# Patient Record
Sex: FEMALE | Race: BLACK OR AFRICAN AMERICAN | Employment: OTHER | RURAL
[De-identification: names, ages, dates, MRNs, and addresses within clinical notes are randomized per-mention and may not be internally consistent; named-entity substitution may affect disease eponyms.]

---

## 2017-02-03 ENCOUNTER — OFFICE VISIT (OUTPATIENT)
Dept: FAMILY MEDICINE CLINIC | Age: 72
End: 2017-02-03

## 2017-02-03 VITALS
OXYGEN SATURATION: 99 % | TEMPERATURE: 98.1 F | SYSTOLIC BLOOD PRESSURE: 152 MMHG | HEIGHT: 70 IN | DIASTOLIC BLOOD PRESSURE: 72 MMHG | RESPIRATION RATE: 16 BRPM | BODY MASS INDEX: 28.29 KG/M2 | HEART RATE: 97 BPM | WEIGHT: 197.6 LBS

## 2017-02-03 DIAGNOSIS — Z13.39 SCREENING FOR ALCOHOLISM: ICD-10-CM

## 2017-02-03 DIAGNOSIS — F17.200 SMOKER: ICD-10-CM

## 2017-02-03 DIAGNOSIS — I10 ESSENTIAL HYPERTENSION: ICD-10-CM

## 2017-02-03 DIAGNOSIS — J42 CHRONIC BRONCHITIS, UNSPECIFIED CHRONIC BRONCHITIS TYPE (HCC): ICD-10-CM

## 2017-02-03 DIAGNOSIS — E11.9 TYPE 2 DIABETES MELLITUS WITHOUT COMPLICATION, WITHOUT LONG-TERM CURRENT USE OF INSULIN (HCC): ICD-10-CM

## 2017-02-03 DIAGNOSIS — E78.2 MIXED HYPERLIPIDEMIA: ICD-10-CM

## 2017-02-03 DIAGNOSIS — Z00.00 ROUTINE GENERAL MEDICAL EXAMINATION AT A HEALTH CARE FACILITY: Primary | ICD-10-CM

## 2017-02-03 NOTE — PATIENT INSTRUCTIONS
Medicare Wellness Visit, Female    The best way to live healthy is to have a healthy lifestyle by eating a well-balanced diet, exercising regularly, limiting alcohol and stopping smoking. Regular physical exams and screening tests are another way to keep healthy. Preventive exams provided by your health care provider can find health problems before they become diseases or illnesses. Preventive services including immunizations, screening tests, monitoring and exams can help you take care of your own health. All people over age 72 should have a pneumovax  and and a prevnar shot to prevent pneumonia. These are once in a lifetime unless you and your provider decide differently. All people over 65 should have a yearly flu shot and a tetanus vaccine every 10 years. A bone mass density to screen for osteoporosis or thinning of the bones should be done every 2 years after 65. Screening for diabetes mellitus with a blood sugar test should be done every year. Glaucoma is a disease of the eye due to increased ocular pressure that can lead to blindness and it should be done every year by an eye professional.    Cardiovascular screening tests that check for elevated lipids (fatty part of blood) which can lead to heart disease and strokes should be done every 5 years. Colorectal screening that evaluates for blood or polyps in your colon should be done yearly as a stool test or every five years as a flexible sigmoidoscope or every 10 years as a colonoscopy up to age 76. Breast cancer screening with a mammogram is recommended biennially  for women age 54-69. Screening for cervical cancer with a pap smear and pelvic exam is recommended for women after age 72 years every 2 years up to age 79 or when the provider and patient decide to stop. If there is a history of cervical abnormalities or other increased risk for cancer then the test is recommended yearly.     Hepatitis C screening is also recommended for anyone born between [address] [place] [address] through [place] [address]. A shingles vaccine is also recommended once in a lifetime after age 61. Your Medicare Wellness Exam is recommended annually.     Here is a list of your current Health Maintenance items with a due date:  Health Maintenance Due   Topic Date Due   Rebecca Maddox Test  1945    Eye Exam  03/17/1957    DTaP/Tdap/Td  (1 - Tdap) 03/17/1968    Stool testing for trace blood  03/17/1997    Shingles Vaccine  03/17/2007    Glaucoma Screening   03/17/2012    Bone Density Screening  03/17/2012    Pneumococcal Vaccine (1 of 2 - PCV13) 03/17/2012    Flu Vaccine  08/01/2016    Hemoglobin A1C    10/07/2016    Annual Well Visit  10/09/2016    Diabetic Foot Care  10/09/2016    Albumin Urine Test  10/09/2016

## 2017-02-03 NOTE — PROGRESS NOTES
[address] [place] [address] Medical [place]   [phone]     2/3/2017  Chief Complaint   Patient presents with   Ioana Marvin Visit       HPI  Ms. Wilberto Archer is a 73 y.o. female presents today for routine annual visit. Tingling in her hands at nights, especially the right hand      Heartburn- occasionally; uses OTC joo seltza at times with good resolution. Patient Active Problem List   Diagnosis Code    Diabetes ([facility] E11.9    Hypertension I10    Hyperlipidemia E78.5    Arthritis, degenerative M19.90    Chronic bronchitis (HCC) J42    Smoker F17.200      No Known Allergies    Current Outpatient Prescriptions on File Prior to Visit   Medication Sig Dispense Refill    metoprolol succinate (TOPROL-XL) 50 mg XL tablet TAKE ONE TABLET BY MOUTH ONCE DAILY 30 Tab 2    metFORMIN ER (GLUCOPHAGE XR) 500 mg tablet TAKE ONE TABLET BY MOUTH ONCE DAILY WITH  DINNER 30 Tab 5    VENTOLIN HFA 90 mcg/actuation inhaler INHALE TWO PUFFS BY MOUTH EVERY 6 HOURS AS NEEDED FOR WHEEZING FOR  ACUTE  ASTHMA  ATTACK 1 Inhaler 3    losartan (COZAAR) 100 mg tablet Take 1 Tab by mouth daily. 30 Tab 6    budesonide-formoterol (SYMBICORT) 160-4.5 mcg/actuation HFA inhaler Take 2 Puffs by inhalation two (2) times a day. Indications: BRONCHOSPASM PREVENTION WITH COPD 1 Inhaler 6    chlorthalidone (HYGROTEN) 25 mg tablet Take 1 Tab by mouth daily. Indications: HYPERTENSION 30 Tab 6    simvastatin (ZOCOR) 20 mg tablet Take 1 Tab by mouth nightly. 30 Tab 3    LORazepam (ATIVAN) 0.5 mg tablet TAKE ONE TABLET BY MOUTH EVERY 8 HOURS AS NEEDED FOR ANXIETY 30 Tab 0     No current facility-administered medications on file prior to visit.         Lab Results   Component Value Date/Time    Hemoglobin A1c 6.7 04/07/2016 11:33 AM    Hemoglobin A1c 6.4 10/09/2015 09:30 AM    Hemoglobin A1c 7.0 10/03/2014 11:33 AM    Glucose 121 04/07/2016 11:33 AM    Glucose  10/03/2014 11:50 AM    LDL, calculated 117 04/07/2016 11:33 AM    Creatinine 0.75 04/07/2016 11:33 AM           Physical Exam  Visit Vitals    /72 (BP 1 Location: Left arm, BP Patient Position: Sitting)    Pulse 97    Temp 98.1 °F (36.7 °C) (Oral)    Resp 16    Ht 5' 10\" (1.778 m)    Wt 197 lb 9.6 oz (89.6 kg)    SpO2 99%    BMI 28.35 kg/m2         Ash Mcdonald was seen today for annual wellness visit.     Diagnoses and all orders for this visit:    Routine general medical examination at a health care facility  -     CBC WITH AUTOMATED DIFF  -     OCCULT BLOOD, IMMUNOASSAY (FIT)    Screening for alcoholism    Type 2 diabetes mellitus without complication, without long-term current use of insulin (HCC)  -     METABOLIC PANEL, COMPREHENSIVE  -     HEMOGLOBIN A1C WITH EAG  -     MICROALBUMIN, UR, RAND W/ MICROALBUMIN/CREA RATIO    Chronic bronchitis, unspecified chronic bronchitis type (HCC)    Essential hypertension    Mixed hyperlipidemia  -     LIPID PANEL WITH LDL/HDL RATIO    Smoker      Plan         Follow-up Disposition: Not on File      [facility]

## 2017-02-03 NOTE — MR AVS SNAPSHOT
Visit Information Date & Time Provider Department Dept. Phone Encounter #  
 2/3/2017 11:00 AM Zamzam Campbell, [address] [phone] [id number] Upcoming Health Maintenance Date Due Hepatitis C Screening 1945 DTaP/Tdap/Td series (1 - Tdap) 3/17/1968 FOBT Q 1 YEAR AGE 50-75 3/17/1997 ZOSTER VACCINE AGE 60> 3/17/2007 GLAUCOMA SCREENING Q2Y 3/17/2012 OSTEOPOROSIS SCREENING (DEXA) 3/17/2012 INFLUENZA AGE 9 TO ADULT 8/1/2016 HEMOGLOBIN A1C Q6M 10/7/2016 MEDICARE YEARLY EXAM 10/9/2016 FOOT EXAM Q1 10/9/2016 MICROALBUMIN Q1 10/9/2016 EYE EXAM RETINAL OR DILATED Q1 6/1/2017* LIPID PANEL Q1 4/7/2017 Pneumococcal 65+ Low/Medium Risk (2 of 2 - PPSV23) 2/3/2018 BREAST CANCER SCRN MAMMOGRAM 5/13/2018 *Topic was postponed. The date shown is not the original due date. Allergies as of 2/3/2017  Review Complete On: 2/3/2017 By: ARMANDO Estrada No Known Allergies Current Immunizations  Reviewed on 2/3/2017 No immunizations on file. Reviewed by ARMANDO Estrada on 2/3/2017 at 11:32 AM  
You Were Diagnosed With   
  
 Codes Comments Routine general medical examination at a health care facility    -  Primary ICD-10-CM: Z00.00 ICD-9-CM: V70.0 Screening for alcoholism     ICD-10-CM: Z13.89 ICD-9-CM: V79.1 Type 2 diabetes mellitus without complication, without long-term current use of insulin (HCC)     ICD-10-CM: E11.9 ICD-9-CM: 250.00 Chronic bronchitis, unspecified chronic bronchitis type ([facility]     ICD-10-CM: B62 ICD-9-CM: 491.9 Essential hypertension     ICD-10-CM: I10 
ICD-9-CM: 401.9 Mixed hyperlipidemia     ICD-10-CM: E78.2 ICD-9-CM: 272.2 Smoker     ICD-10-CM: B10.561 ICD-9-CM: 305.1 Vitals BP Pulse Temp Resp Height(growth percentile) Weight(growth percentile)  152/72 (BP 1 Location: Left arm, BP Patient Position: Sitting) 97 98.1 °F (36.7 °C) (Oral) 16 5' 10\" (1.778 m) 197 lb 9.6 oz (89.6 kg) SpO2 BMI OB Status Smoking Status 99% 28.35 kg/m2 Postmenopausal Current Every Day Smoker BMI and BSA Data Body Mass Index Body Surface Area  
 28.35 kg/m 2 2.1 m 2 Preferred Pharmacy Pharmacy Name Phone [facility], [place] - [address] [phone] Your Updated Medication List  
  
   
This list is accurate as of: 2/3/17 12:48 PM.  Always use your most recent med list.  
  
  
  
  
 budesonide-formoterol 160-4.5 mcg/actuation HFA inhaler Commonly known as:  SYMBICORT Take 2 Puffs by inhalation two (2) times a day. Indications: BRONCHOSPASM PREVENTION WITH COPD  
  
 chlorthalidone 25 mg tablet Commonly known as:  Chen Juniper Take 1 Tab by mouth daily. Indications: HYPERTENSION  
  
 LORazepam 0.5 mg tablet Commonly known as:  ATIVAN  
TAKE ONE TABLET BY MOUTH EVERY 8 HOURS AS NEEDED FOR ANXIETY  
  
 losartan 100 mg tablet Commonly known as:  COZAAR Take 1 Tab by mouth daily. metFORMIN  mg tablet Commonly known as:  GLUCOPHAGE XR  
TAKE ONE TABLET BY MOUTH ONCE DAILY WITH  DINNER  
  
 metoprolol succinate 50 mg XL tablet Commonly known as:  TOPROL-XL  
TAKE ONE TABLET BY MOUTH ONCE DAILY  
  
 simvastatin 20 mg tablet Commonly known as:  ZOCOR Take 1 Tab by mouth nightly. VENTOLIN HFA 90 mcg/actuation inhaler Generic drug:  albuterol INHALE TWO PUFFS BY MOUTH EVERY 6 HOURS AS NEEDED FOR WHEEZING FOR  ACUTE  ASTHMA  ATTACK We Performed the Following CBC WITH AUTOMATED DIFF [68809 CPT(R)] HEMOGLOBIN A1C WITH EAG [59710 CPT(R)] LIPID PANEL WITH LDL/HDL RATIO [60658 CPT(R)] METABOLIC PANEL, COMPREHENSIVE [92488 CPT(R)] MICROALBUMIN, UR, RAND W/ MICROALBUMIN/CREA RATIO [id number] CPT(R)] OCCULT BLOOD, IMMUNOASSAY (FIT) [id number] CPT(R)] Patient Instructions Medicare Wellness Visit, Female The best way to live healthy is to have a healthy lifestyle by eating a well-balanced diet, exercising regularly, limiting alcohol and stopping smoking. Regular physical exams and screening tests are another way to keep healthy. Preventive exams provided by your health care provider can find health problems before they become diseases or illnesses. Preventive services including immunizations, screening tests, monitoring and exams can help you take care of your own health. All people over age 72 should have a pneumovax  and and a prevnar shot to prevent pneumonia. These are once in a lifetime unless you and your provider decide differently. All people over 65 should have a yearly flu shot and a tetanus vaccine every 10 years. A bone mass density to screen for osteoporosis or thinning of the bones should be done every 2 years after 65. Screening for diabetes mellitus with a blood sugar test should be done every year. Glaucoma is a disease of the eye due to increased ocular pressure that can lead to blindness and it should be done every year by an eye professional. 
 
Cardiovascular screening tests that check for elevated lipids (fatty part of blood) which can lead to heart disease and strokes should be done every 5 years. Colorectal screening that evaluates for blood or polyps in your colon should be done yearly as a stool test or every five years as a flexible sigmoidoscope or every 10 years as a colonoscopy up to age 76. Breast cancer screening with a mammogram is recommended biennially  for women age 54-69. Screening for cervical cancer with a pap smear and pelvic exam is recommended for women after age 72 years every 2 years up to age 79 or when the provider and patient decide to stop. If there is a history of cervical abnormalities or other increased risk for cancer then the test is recommended yearly.  
 
Hepatitis C screening is also recommended for anyone born between [birth year] through 1965. A shingles vaccine is also recommended once in a lifetime after age 61. Your Medicare Wellness Exam is recommended annually. Here is a list of your current Health Maintenance items with a due date: 
Health Maintenance Due Topic Date Due  
 Hepatitis C Test  1945  Eye Exam  03/17/1957  
 DTaP/Tdap/Td  (1 - Tdap) 03/17/1968  Stool testing for trace blood  03/17/1997  Shingles Vaccine  03/17/2007  Glaucoma Screening   03/17/2012  Bone Density Screening  03/17/2012  Pneumococcal Vaccine (1 of 2 - PCV13) 03/17/2012  Flu Vaccine  08/01/2016  Hemoglobin A1C    10/07/2016 [facility] Annual Well Visit  10/09/2016 [facility] Diabetic Foot Care  10/09/2016  Albumin Urine Test  10/09/2016 Introducing [facility]! Aria Cohen introduces [facility] patient portal. Now you can access parts of your medical record, email your doctor's office, and request medication refills online. 1. In your internet browser, go to [email or web address] 2. Click on the First Time User? Click Here link in the Sign In box. You will see the New Member Sign Up page. 3. Enter your [facility] Access Code exactly as it appears below. You will not need to use this code after youve completed the sign-up process. If you do not sign up before the expiration date, you must request a new code. · [facility] Access Code: [email or web address] Expires: 5/4/2017 12:48 PM 
 
4. Enter the last four digits of your Social Security Number (xxxx) and Date of Birth (mm/dd/yyyy) as indicated and click Submit. You will be taken to the next sign-up page. 5. Create a HAULt ID. This will be your [facility] login ID and cannot be changed, so think of one that is secure and easy to remember. 6. Create a [facility] password. You can change your password at any time. 7. Enter your Password Reset Question and Answer. This can be used at a later time if you forget your password. 8. Enter your e-mail address. You will receive e-mail notification when new information is available in [address] 9. Click Sign Up. You can now view and download portions of your medical record. 10. Click the Download Summary menu link to download a portable copy of your medical information. If you have questions, please visit the Frequently Asked Questions section of the [employer] website. Remember, [employer] is NOT to be used for urgent needs. For medical emergencies, dial 911. Now available from your iPhone and Android! Please provide this summary of care documentation to your next provider. Your primary care clinician is listed as Rosa Yeboah. If you have any questions after today's visit, please call [phone].

## 2017-02-03 NOTE — PROGRESS NOTES
[address], [facility] Medical [place]   [phone]     2/3/2017  Chief Complaint   Patient presents with   Sudheer Armas Visit       HPI  Ms. Abel Boykin is a 73 y.o. female     States she is doing well overall. Review of Systems   Constitutional: Negative. Respiratory: Negative. Cardiovascular: Negative. Gastrointestinal: Positive for heartburn (occasionally ). Negative for abdominal pain. Genitourinary: Negative. Musculoskeletal: Positive for joint pain. Neurological: Positive for tingling (right hand at night at times ). Negative for speech change, focal weakness and headaches. Patient Active Problem List   Diagnosis Code    Diabetes ([facility] [address] E11.9    Hypertension I10    Hyperlipidemia E78.5    Arthritis, degenerative M19.90    Chronic bronchitis (HCC) J42    Smoker F17.200      No Known Allergies    Current Outpatient Prescriptions on File Prior to Visit   Medication Sig Dispense Refill    metoprolol succinate (TOPROL-XL) 50 mg XL tablet TAKE ONE TABLET BY MOUTH ONCE DAILY 30 Tab 2    metFORMIN ER (GLUCOPHAGE XR) 500 mg tablet TAKE ONE TABLET BY MOUTH ONCE DAILY WITH  DINNER 30 Tab 5    VENTOLIN HFA 90 mcg/actuation inhaler INHALE TWO PUFFS BY MOUTH EVERY 6 HOURS AS NEEDED FOR WHEEZING FOR  ACUTE  ASTHMA  ATTACK 1 Inhaler 3    losartan (COZAAR) 100 mg tablet Take 1 Tab by mouth daily. 30 Tab 6    budesonide-formoterol (SYMBICORT) 160-4.5 mcg/actuation HFA inhaler Take 2 Puffs by inhalation two (2) times a day. Indications: BRONCHOSPASM PREVENTION WITH COPD 1 Inhaler 6    chlorthalidone (HYGROTEN) 25 mg tablet Take 1 Tab by mouth daily. Indications: HYPERTENSION 30 Tab 6    simvastatin (ZOCOR) 20 mg tablet Take 1 Tab by mouth nightly. 30 Tab 3    LORazepam (ATIVAN) 0.5 mg tablet TAKE ONE TABLET BY MOUTH EVERY 8 HOURS AS NEEDED FOR ANXIETY 30 Tab 0     No current facility-administered medications on file prior to visit. Lab Results   Component Value Date/Time    Hemoglobin A1c 6.7 04/07/2016 11:33 AM    Hemoglobin A1c 6.4 10/09/2015 09:30 AM    Hemoglobin A1c 7.0 10/03/2014 11:33 AM    Glucose 121 04/07/2016 11:33 AM    Glucose  10/03/2014 11:50 AM    LDL, calculated 117 04/07/2016 11:33 AM    Creatinine 0.75 04/07/2016 11:33 AM         Physical Exam   Constitutional: No distress. Neck: Neck supple. No thyromegaly present. Cardiovascular: Normal heart sounds. Pulmonary/Chest: Effort normal and breath sounds normal.   Abdominal: Soft. There is no tenderness. Lymphadenopathy:     She has no cervical adenopathy. Vitals reviewed. Visit Vitals    /72 (BP 1 Location: Left arm, BP Patient Position: Sitting)    Pulse 97    Temp 98.1 °F (36.7 °C) (Oral)    Resp 16    Ht 5' 10\" (1.778 m)    Wt 197 lb 9.6 oz (89.6 kg)    SpO2 99%    BMI 28.35 kg/m2         Luis Goodman was seen today for annual wellness visit. Diagnoses and all orders for this visit:    Routine general medical examination at a health care facility  -     CBC WITH AUTOMATED DIFF  -     OCCULT BLOOD, IMMUNOASSAY (FIT)    Screening for alcoholism    Type 2 diabetes mellitus without complication, without long-term current use of insulin (HCC)  -     METABOLIC PANEL, COMPREHENSIVE  -     HEMOGLOBIN A1C WITH EAG  -     MICROALBUMIN, UR, RAND W/ MICROALBUMIN/CREA RATIO    Chronic bronchitis, unspecified chronic bronchitis type (HCC)    Essential hypertension    Mixed hyperlipidemia  -     LIPID PANEL WITH LDL/HDL RATIO    Smoker      Plan   Discussed smoking cessation        Follow-up Disposition:  Return in about 6 months (around 8/3/2017).       Gabino Duarte PA-C, MHP

## 2017-02-03 NOTE — PROGRESS NOTES
This is a Subsequent Medicare Annual Wellness Visit providing Personalized Prevention Plan Services (PPPS) (Performed 12 months after initial AWV and PPPS )    I have reviewed the patient's medical history in detail and updated the computerized patient record. History     Past Medical History   Diagnosis Date    Chronic obstructive pulmonary disease ([facility]     Diabetes ([facility]     Environmental allergies      5      para 5    Hyperlipidemia     Hypertension     Hypokalemia       Past Surgical History   Procedure Laterality Date    Hx cataract removal  2013. Current Outpatient Prescriptions   Medication Sig Dispense Refill    metoprolol succinate (TOPROL-XL) 50 mg XL tablet TAKE ONE TABLET BY MOUTH ONCE DAILY 30 Tab 2    metFORMIN ER (GLUCOPHAGE XR) 500 mg tablet TAKE ONE TABLET BY MOUTH ONCE DAILY WITH  DINNER 30 Tab 5    losartan (COZAAR) 100 mg tablet Take 1 Tab by mouth daily. 30 Tab 6    budesonide-formoterol (SYMBICORT) 160-4.5 mcg/actuation HFA inhaler Take 2 Puffs by inhalation two (2) times a day. Indications: BRONCHOSPASM PREVENTION WITH COPD 1 Inhaler 6    chlorthalidone (HYGROTEN) 25 mg tablet Take 1 Tab by mouth daily. Indications: HYPERTENSION 30 Tab 6    simvastatin (ZOCOR) 20 mg tablet Take 1 Tab by mouth nightly. 30 Tab 3    LORazepam (ATIVAN) 0.5 mg tablet TAKE ONE TABLET BY MOUTH EVERY 8 HOURS AS NEEDED FOR ANXIETY 30 Tab 0    VENTOLIN HFA 90 mcg/actuation inhaler INHALE TWO PUFFS BY MOUTH EVERY 6 HOURS AS NEEDED FOR WHEEZING FOR  ACUTE  ASTHMA  ATTACK 1 Inhaler 3    KLOR-CON M10 10 mEq tablet TAKE ONE TABLET BY MOUTH EVERY DAY FOR  POTASSIUM 30 Tab 0     No Known Allergies  Family History   Problem Relation Age of Onset    Cancer Mother      COLON CA ?  Cancer Father      ? UNKNOWN TYPE     Social History   Substance Use Topics    Smoking status: Current Every Day Smoker    Smokeless tobacco: Never Used    Alcohol use Yes     Patient Active Problem List Diagnosis Code    Diabetes ([facility] E11.9    Hypertension I10    Hyperlipidemia E78.5    Arthritis, degenerative M19.90    Chronic bronchitis ([facility] J42    Smoker F17.200       Depression Risk Factor Screening:     PHQ 2 / 9, over the last two weeks 2/3/2017   Little interest or pleasure in doing things Several days   Feeling down, depressed or hopeless Several days   Total Score PHQ 2 2     Alcohol Risk Factor Screening: On any occasion during the past 3 months, have you had more than 3 drinks containing alcohol? Yes    Do you average more than 7 drinks per week? No      Functional Ability and Level of Safety:     Hearing Loss   mild    Activities of Daily Living   Self-care. Requires assistance with: no ADLs    Fall Risk     Fall Risk Assessment, last 12 mths 2/3/2017   Able to walk? Yes   Fall in past 12 months? No     Abuse Screen   Patient is not abused    Review of Systems   A comprehensive review of systems was negative except for that written in the HPI. Physical Examination     Evaluation of Cognitive Function:  Mood/affect:  happy  Appearance: age appropriate  Family member/caregiver input: none        Patient Care Team:  ARMANDO Wang as PCP - General    Advice/Referrals/Counseling   Education and counseling provided:  Are appropriate based on today's review and evaluation  End-of-Life planning (with patient's consent)  Pneumococcal Vaccine  Colorectal cancer screening tests    Assessment/Plan       ICD-10-CM ICD-9-CM    1. Routine general medical examination at a health care facility Z00.00 V70.0 CBC WITH AUTOMATED DIFF      OCCULT BLOOD, IMMUNOASSAY (FIT)   2. Screening for alcoholism Z13.89 V79.1    3. Type 2 diabetes mellitus without complication, without long-term current use of insulin ([facility]) A91.8 313.65 METABOLIC PANEL, COMPREHENSIVE      HEMOGLOBIN A1C WITH EAG      MICROALBUMIN, UR, RAND W/ MICROALBUMIN/CREA RATIO   4.  Chronic bronchitis, unspecified chronic bronchitis type ([facility] J42 491.9    5. Essential hypertension I10 401.9    6. Mixed hyperlipidemia E78.2 272.2 LIPID PANEL WITH LDL/HDL RATIO   7. Smoker F17.200 305.1      current treatment plan is effective, no change in therapy  lab results and schedule of future lab studies reviewed with patient.

## 2017-02-04 LAB
ALBUMIN SERPL-MCNC: 4.2 G/DL (ref 3.6–4.8)
ALBUMIN/CREAT UR: 11 MG/G CREAT (ref 0–30)
ALBUMIN/GLOB SERPL: 1.2 {RATIO} (ref 1.1–2.5)
ALP SERPL-CCNC: 67 IU/L (ref 39–117)
ALT SERPL-CCNC: 12 IU/L (ref 0–32)
AST SERPL-CCNC: 13 IU/L (ref 0–40)
BASOPHILS # BLD AUTO: 0.1 X10E3/UL (ref 0–0.2)
BASOPHILS NFR BLD AUTO: 1 %
BILIRUB SERPL-MCNC: 0.3 MG/DL (ref 0–1.2)
BUN SERPL-MCNC: 12 MG/DL (ref 8–27)
BUN/CREAT SERPL: 17 (ref 11–26)
CALCIUM SERPL-MCNC: 9.6 MG/DL (ref 8.7–10.3)
CHLORIDE SERPL-SCNC: 101 MMOL/L (ref 96–106)
CHOLEST SERPL-MCNC: 237 MG/DL (ref 100–199)
CO2 SERPL-SCNC: 24 MMOL/L (ref 18–29)
CREAT SERPL-MCNC: 0.72 MG/DL (ref 0.57–1)
CREAT UR-MCNC: 87.8 MG/DL
EOSINOPHIL # BLD AUTO: 0.3 X10E3/UL (ref 0–0.4)
EOSINOPHIL NFR BLD AUTO: 3 %
ERYTHROCYTE [DISTWIDTH] IN BLOOD BY AUTOMATED COUNT: 13.6 % (ref 12.3–15.4)
EST. AVERAGE GLUCOSE BLD GHB EST-MCNC: 146 MG/DL
GLOBULIN SER CALC-MCNC: 3.5 G/DL (ref 1.5–4.5)
GLUCOSE SERPL-MCNC: 125 MG/DL (ref 65–99)
HBA1C MFR BLD: 6.7 % (ref 4.8–5.6)
HCT VFR BLD AUTO: 41.5 % (ref 34–46.6)
HDLC SERPL-MCNC: 45 MG/DL
HGB BLD-MCNC: 14.3 G/DL (ref 11.1–15.9)
IMM GRANULOCYTES # BLD: 0 X10E3/UL (ref 0–0.1)
IMM GRANULOCYTES NFR BLD: 0 %
LDLC SERPL CALC-MCNC: 131 MG/DL (ref 0–99)
LDLC/HDLC SERPL: 2.9 RATIO UNITS (ref 0–3.2)
LYMPHOCYTES # BLD AUTO: 4.2 X10E3/UL (ref 0.7–3.1)
LYMPHOCYTES NFR BLD AUTO: 37 %
MCH RBC QN AUTO: 30.2 PG (ref 26.6–33)
MCHC RBC AUTO-ENTMCNC: 34.5 G/DL (ref 31.5–35.7)
MCV RBC AUTO: 88 FL (ref 79–97)
MICROALBUMIN UR-MCNC: 9.7 UG/ML
MONOCYTES # BLD AUTO: 0.5 X10E3/UL (ref 0.1–0.9)
MONOCYTES NFR BLD AUTO: 4 %
NEUTROPHILS # BLD AUTO: 6.2 X10E3/UL (ref 1.4–7)
NEUTROPHILS NFR BLD AUTO: 55 %
PLATELET # BLD AUTO: 341 X10E3/UL (ref 150–379)
POTASSIUM SERPL-SCNC: 4.9 MMOL/L (ref 3.5–5.2)
PROT SERPL-MCNC: 7.7 G/DL (ref 6–8.5)
RBC # BLD AUTO: 4.74 X10E6/UL (ref 3.77–5.28)
SODIUM SERPL-SCNC: 143 MMOL/L (ref 134–144)
TRIGL SERPL-MCNC: 307 MG/DL (ref 0–149)
VLDLC SERPL CALC-MCNC: 61 MG/DL (ref 5–40)
WBC # BLD AUTO: 11.3 X10E3/UL (ref 3.4–10.8)

## 2017-02-10 NOTE — PROGRESS NOTES
Cholesterol, LDL and triglyceride levels are elevated and increased from one year ago. Minimize saturated fats and carbohydrates. Exercise daily. Blood sugar- HgA1C is stable and well controlled. Continue with current regimen.

## 2017-05-12 RX ORDER — METFORMIN HYDROCHLORIDE 500 MG/1
TABLET, EXTENDED RELEASE ORAL
Qty: 90 TAB | Refills: 3 | Status: SHIPPED | OUTPATIENT
Start: 2017-05-12 | End: 2017-12-06 | Stop reason: SDUPTHER

## 2017-06-16 RX ORDER — METOPROLOL SUCCINATE 50 MG/1
TABLET, EXTENDED RELEASE ORAL
Qty: 90 TAB | Refills: 3 | Status: SHIPPED | OUTPATIENT
Start: 2017-06-16 | End: 2018-09-04 | Stop reason: SDUPTHER

## 2017-08-09 RX ORDER — LOSARTAN POTASSIUM 100 MG/1
100 TABLET ORAL DAILY
Qty: 90 TAB | Refills: 2 | Status: SHIPPED | OUTPATIENT
Start: 2017-08-09 | End: 2018-06-02 | Stop reason: SDUPTHER

## 2017-12-06 DIAGNOSIS — J42 CHRONIC BRONCHITIS, UNSPECIFIED CHRONIC BRONCHITIS TYPE (HCC): ICD-10-CM

## 2017-12-06 RX ORDER — BUDESONIDE AND FORMOTEROL FUMARATE DIHYDRATE 160; 4.5 UG/1; UG/1
2 AEROSOL RESPIRATORY (INHALATION) 2 TIMES DAILY
Qty: 1 INHALER | Refills: 6 | Status: SHIPPED | OUTPATIENT
Start: 2017-12-06 | End: 2019-01-01 | Stop reason: SDUPTHER

## 2017-12-06 RX ORDER — METFORMIN HYDROCHLORIDE 500 MG/1
TABLET, EXTENDED RELEASE ORAL
Qty: 90 TAB | Refills: 3 | Status: SHIPPED | OUTPATIENT
Start: 2017-12-06 | End: 2018-01-15 | Stop reason: SDUPTHER

## 2017-12-06 RX ORDER — ALBUTEROL SULFATE 90 UG/1
AEROSOL, METERED RESPIRATORY (INHALATION)
Qty: 1 INHALER | Refills: 3 | Status: SHIPPED | OUTPATIENT
Start: 2017-12-06 | End: 2018-11-03 | Stop reason: SDUPTHER

## 2017-12-06 NOTE — TELEPHONE ENCOUNTER
Patient has an appointment on 1/15/2018 to see Abebe Patel. She needs in the meantime a refill on Metformin and her inhalers sent to [facility] in [place].

## 2018-01-15 ENCOUNTER — OFFICE VISIT (OUTPATIENT)
Dept: FAMILY MEDICINE CLINIC | Age: 73
End: 2018-01-15

## 2018-01-15 VITALS
WEIGHT: 196 LBS | DIASTOLIC BLOOD PRESSURE: 90 MMHG | HEIGHT: 70 IN | TEMPERATURE: 98.2 F | OXYGEN SATURATION: 99 % | BODY MASS INDEX: 28.06 KG/M2 | HEART RATE: 90 BPM | SYSTOLIC BLOOD PRESSURE: 144 MMHG | RESPIRATION RATE: 22 BRPM

## 2018-01-15 DIAGNOSIS — E11.9 COMPREHENSIVE DIABETIC FOOT EXAMINATION, TYPE 2 DM, ENCOUNTER FOR (HCC): ICD-10-CM

## 2018-01-15 DIAGNOSIS — Z12.39 SCREENING FOR BREAST CANCER: ICD-10-CM

## 2018-01-15 DIAGNOSIS — Z13.29 SCREENING FOR THYROID DISORDER: ICD-10-CM

## 2018-01-15 DIAGNOSIS — E11.9 TYPE 2 DIABETES MELLITUS WITHOUT COMPLICATION, WITHOUT LONG-TERM CURRENT USE OF INSULIN (HCC): ICD-10-CM

## 2018-01-15 DIAGNOSIS — Z78.0 MENOPAUSE: ICD-10-CM

## 2018-01-15 DIAGNOSIS — I10 ESSENTIAL HYPERTENSION: Primary | ICD-10-CM

## 2018-01-15 DIAGNOSIS — E78.2 MIXED HYPERLIPIDEMIA: ICD-10-CM

## 2018-01-15 DIAGNOSIS — Z12.11 COLON CANCER SCREENING: ICD-10-CM

## 2018-01-15 RX ORDER — LORAZEPAM 0.5 MG/1
TABLET ORAL
Qty: 30 TAB | Refills: 0 | Status: CANCELLED | OUTPATIENT
Start: 2018-01-15

## 2018-01-15 RX ORDER — METFORMIN HYDROCHLORIDE 500 MG/1
TABLET, EXTENDED RELEASE ORAL
Qty: 90 TAB | Refills: 3 | Status: SHIPPED | OUTPATIENT
Start: 2018-01-15 | End: 2019-03-01 | Stop reason: SDUPTHER

## 2018-01-15 NOTE — PROGRESS NOTES
Subjective:     Chu Alves is a 81 y.o. female who presents today with the following:  Chief Complaint   Patient presents with    Diabetes     checkup    Anxiety     refill   Chu Alves presents for follow up for chronic disease management. COMPLIANT WITH MEDICATION:   HTN; Denies chest pain, dyspnea, palpitations, headache and blurred vision. Blood pressure elevated today. DM:   Diabetes. Sugars controlled moderately  Hypoglycemia: none  Tolerating current treatment moderately  Current medications include diet  oral agent (monotherapy): Glucophage XR    Lab Results   Component Value Date/Time    Hemoglobin A1c 6.7 02/03/2017 11:39 AM    Hemoglobin A1c 6.7 04/07/2016 11:33 AM    Hemoglobin A1c 6.4 10/09/2015 09:30 AM    Glucose 125 02/03/2017 11:39 AM    Glucose  10/03/2014 11:50 AM    Microalb/Creat ratio (ug/mg creat.) 11.0 02/03/2017 11:39 AM    LDL, calculated 131 02/03/2017 11:39 AM    Creatinine 0.72 02/03/2017 11:39 AM     Lab Results   Component Value Date/Time    Microalb/Creat ratio (ug/mg creat.) 11.0 02/03/2017 11:39 AM       Last eye exam performed  greather than 1 year  ago and was normal.    Last foot exam 10/9/2015 performed greather than 1 year ago. warm, good capillary refill        Hyperlipidemia:  Labs today      Anxiety:  Has not taken lorazepam in over a year. Discussed stress management. She feels she can handle stress better now. Had a lot of anxiety while she was caring for her mother.        HM: FIT test provided, schedule for Dexa Scan and  mammogram     ROS:  Gen: denies fever, chills, fatigue, weight loss, weight gain  HEENT:denies blurry vision, nasal congestion, sore throat  Resp: denies dypsnea, cough, wheezing  CV: denies chest pain radiating to the jaws or arms, palpitations, lower extremity edema  Abd: denies nausea, vomiting, diarrhea, constipation  Neuro: denies numbness/tingling  Endo: denies polyuria, polydipsia, heat/cold intolerance  Heme: no lymphadenopathy    No Known Allergies      Current Outpatient Prescriptions:     metFORMIN ER (GLUCOPHAGE XR) 500 mg tablet, TAKE ONE TABLET BY MOUTH ONCE DAILY WITH  DINNER  Indications: type 2 diabetes mellitus, Disp: 90 Tab, Rfl: 3    losartan (COZAAR) 100 mg tablet, Take 1 Tab by mouth daily. Indications: hypertension, Disp: 90 Tab, Rfl: 2    metoprolol succinate (TOPROL-XL) 50 mg XL tablet, TAKE ONE TABLET BY MOUTH ONCE DAILY, Disp: 90 Tab, Rfl: 3    LORazepam (ATIVAN) 0.5 mg tablet, TAKE ONE TABLET BY MOUTH EVERY 8 HOURS AS NEEDED FOR ANXIETY, Disp: 30 Tab, Rfl: 0    budesonide-formoterol (SYMBICORT) 160-4.5 mcg/actuation HFAA, Take 2 Puffs by inhalation two (2) times a day. Indications: BRONCHOSPASM PREVENTION WITH COPD, Disp: 1 Inhaler, Rfl: 6    albuterol (VENTOLIN HFA) 90 mcg/actuation inhaler, INHALE TWO PUFFS BY MOUTH EVERY 6 HOURS AS NEEDED FOR WHEEZING FOR  ACUTE  ASTHMA  ATTACK, Disp: 1 Inhaler, Rfl: 3    simvastatin (ZOCOR) 20 mg tablet, TAKE 1 TABLET BY MOUTH NIGHTLY, Disp: 30 Tab, Rfl: 11    chlorthalidone (HYGROTEN) 25 mg tablet, Take 1 Tab by mouth daily. Indications: HYPERTENSION, Disp: 30 Tab, Rfl: 6    Past Medical History:   Diagnosis Date    Chronic obstructive pulmonary disease ([facility] [address] [facility]     Diabetes ([facility] [address] [facility]     Environmental allergies      5     para 5    Hyperlipidemia     Hypertension     Hypokalemia        Past Surgical History:   Procedure Laterality Date    HX CATARACT REMOVAL  2013.        History   Smoking Status    Current Every Day Smoker   Smokeless Tobacco    Never Used       Social History     Social History    Marital status: SINGLE     Spouse name: N/A    Number of children: N/A    Years of education: N/A     Social History Main Topics    Smoking status: Current Every Day Smoker    Smokeless tobacco: Never Used    Alcohol use Yes    Drug use: No    Sexual activity: Not Asked     Other Topics Concern     Service No    Blood Transfusions No    Caffeine Concern Yes     Drinks Lots Of Sodas    Occupational Exposure No    Hobby Hazards No    Sleep Concern No    Stress Concern No    Weight Concern No    Special Diet No    Back Care No    Exercise No    Bike Helmet No    Seat Belt Yes    Self-Exams Yes     Social History Narrative       Family History   Problem Relation Age of Onset    Cancer Mother      COLON CA ?  Cancer Father      ? UNKNOWN TYPE         Objective:     Visit Vitals    /90 (BP 1 Location: Left arm, BP Patient Position: Sitting)    Pulse 90    Temp 98.2 °F (36.8 °C) (Temporal)    Resp 22    Ht 5' 10\" (1.778 m)    Wt 196 lb (88.9 kg)    SpO2 99%    BMI 28.12 kg/m2     Body mass index is 28.12 kg/(m^2). General: Alert and oriented. No acute distress. Well nourished  HEENT :  Ears:TMs are normal. Canals are clear. Eyes: pupils equal, round, react to light and accommodation. Extra ocular movements intact. Nose: patent. Mouth and throat is clear. Neck:supple full range of motion no thyromegaly. Trachea midline, No carotid bruits. No significant lymphadenopathy  Lungs[de-identified] clear to auscultation without wheezes, rales, or rhonchi. Heart :RRR, S1 & S2 are normal intensity. No murmur; no gallop  Abdomen: bowel sounds active. No tenderness, guarding, rebound, masses, hepatic or spleen enlargement  Back: no CVA tenderness. Extremities: without clubbing, cyanosis, or edema  Pulses: radial and femoral pulses are normal  Neuro: HMF intact.  Cranial nerves II through XII grossly normal.  Motor: is 5 over 5 and symmetrical.   Deep tendon reflexes: +2 equal         Diabetic foot exam performed by Mariana Ron NP     Measurement  Response Nurse Comment Physician Comment   Monofilament  R - reduced sensation with micro filament  L - reduced sensation with micro filament     Pulse DP R - present  L - present     Pulse TP R - present  L - present     Structural deformity R - None  L - None     Skin Integrity / Deformity R - None  L - None        Reviewed by:               No results found for this visit on 01/15/18. Assessment/ Plan:     Diagnoses and all orders for this visit:    1. Essential hypertension  -     CBC WITH AUTOMATED DIFF  -     METABOLIC PANEL, COMPREHENSIVE  -     LIPID PANEL  -     COLLECTION VENOUS BLOOD,VENIPUNCTURE    2. BMI 28.0-28.9,adult    3. Colon cancer screening  -     OCCULT BLOOD, IMMUNOASSAY (FIT); Future    4. Type 2 diabetes mellitus without complication, without long-term current use of insulin (HCC)  -     CBC WITH AUTOMATED DIFF  -     METABOLIC PANEL, COMPREHENSIVE  -     LIPID PANEL  -     COLLECTION VENOUS BLOOD,VENIPUNCTURE  -     MICROALBUMIN, UR, RAND W/ MICROALBUMIN/CREA RATIO  -     HEMOGLOBIN A1C WITH EAG  -      DIABETES FOOT EXAM    5. Mixed hyperlipidemia  -     CBC WITH AUTOMATED DIFF  -     METABOLIC PANEL, COMPREHENSIVE  -     LIPID PANEL  -     COLLECTION VENOUS BLOOD,VENIPUNCTURE    6. Screening for thyroid disorder  -     TSH 3RD GENERATION    7. Menopause  -     DEXA BONE DENSITY STUDY AXIAL; Future    8. Screening for breast cancer  -     [facility] 3D BENNETT W MAMMO BI DX INCL CAD; Future    9. Comprehensive diabetic foot examination, type 2 DM, encounter for ([facility]  -      DIABETES FOOT EXAM    Other orders  -     metFORMIN ER (GLUCOPHAGE XR) 500 mg tablet; TAKE ONE TABLET BY MOUTH ONCE DAILY WITH  DINNER  Indications: type 2 diabetes mellitus         1. Essential hypertension    2. BMI 28.0-28.9,adult    3. Colon cancer screening    4. Type 2 diabetes mellitus without complication, without long-term current use of insulin ([facility]    5. Mixed hyperlipidemia    6. Screening for thyroid disorder    7. Menopause    8. Screening for breast cancer    9.  Comprehensive diabetic foot examination, type 2 DM, encounter for ([facility]        Orders Placed This Encounter    COLLECTION VENOUS BLOOD,VENIPUNCTURE    DEXA BONE DENSITY STUDY AXIAL     Standing Status:   Future     Standing Expiration Date:   2/15/2019     Order Specific Question:   Reason for Exam     Answer:   screening for osteoporosis, menopause    [facility] 3D BENNETT W MAMMO BI DX INCL CAD     Standing Status:   Future     Standing Expiration Date:   2/15/2019     Scheduling Instructions:      [facility]     Order Specific Question:   Reason for Exam     Answer:   menopause    OCCULT BLOOD, IMMUNOASSAY (FIT)     Standing Status:   Future     Standing Expiration Date:   7/15/2018    CBC WITH AUTOMATED DIFF    METABOLIC PANEL, COMPREHENSIVE    LIPID PANEL    MICROALBUMIN, UR, RAND W/ MICROALBUMIN/CREA RATIO    HEMOGLOBIN A1C WITH EAG    TSH 3RD GENERATION     DIABETES FOOT EXAM    metFORMIN ER (GLUCOPHAGE XR) 500 mg tablet     Sig: TAKE ONE TABLET BY MOUTH ONCE DAILY WITH  DINNER  Indications: type 2 diabetes mellitus     Dispense:  90 Tab     Refill:  3     Camilla Lezama NPBETTIE    Verbal and written instructions (see AVS) provided.  Patient expresses understanding of diagnosis and treatment plan. Follow-up Disposition:  Return in about 4 months (around 5/15/2018).       Mortimer Fall, FNP-C

## 2018-01-15 NOTE — MR AVS SNAPSHOT
Visit Information Date & Time Provider Department Dept. Phone Encounter #  
 1/15/2018 10:00 AM Teodora Isabel NP [address] [phone] [id number] Follow-up Instructions Return in about 4 months (around 5/15/2018). Upcoming Health Maintenance Date Due Hepatitis C Screening 1945 EYE EXAM RETINAL OR DILATED Q1 3/17/1957 FOBT Q 1 YEAR AGE 50-75 3/17/1997 GLAUCOMA SCREENING Q2Y 3/17/2012 OSTEOPOROSIS SCREENING (DEXA) 3/17/2012 FOOT EXAM Q1 10/9/2016 HEMOGLOBIN A1C Q6M 8/3/2017 MICROALBUMIN Q1 2/3/2018 LIPID PANEL Q1 2/3/2018 BREAST CANCER SCRN MAMMOGRAM 5/13/2018 Pneumococcal 65+ Low/Medium Risk (2 of 2 - PPSV23) 2/3/2018 MEDICARE YEARLY EXAM 2/4/2018 DTaP/Tdap/Td series (2 - Td) 1/15/2028 Allergies as of 1/15/2018  Review Complete On: 1/15/2018 By: Teodora Isabel NP No Known Allergies Current Immunizations  Reviewed on 2/3/2017 No immunizations on file. Not reviewed this visit You Were Diagnosed With   
  
 Codes Comments Essential hypertension    -  Primary ICD-10-CM: I10 
ICD-9-CM: 401.9 BMI 28.0-28.9,adult     ICD-10-CM: F01.42 
ICD-9-CM: V85.24 Colon cancer screening     ICD-10-CM: Z12.11 ICD-9-CM: V76.51 Type 2 diabetes mellitus without complication, without long-term current use of insulin (HCC)     ICD-10-CM: E11.9 ICD-9-CM: 250.00 Mixed hyperlipidemia     ICD-10-CM: E78.2 ICD-9-CM: 272.2 Screening for thyroid disorder     ICD-10-CM: Z13.29 ICD-9-CM: V77.0 Menopause     ICD-10-CM: Z78.0 ICD-9-CM: 627.2 Screening for breast cancer     ICD-10-CM: Z12.31 
ICD-9-CM: V76.10 Vitals BP Pulse Temp Resp Height(growth percentile) 144/90 (BP 1 Location: Left arm, BP Patient Position: Sitting) 90 98.2 °F (36.8 °C) (Temporal) 22 5' 10\" (1.778 m) Weight(growth percentile) SpO2 BMI OB Status Smoking Status 196 lb (88.9 kg) 99% 28.12 kg/m2 Postmenopausal Current Every Day Smoker Vitals History BMI and BSA Data Body Mass Index Body Surface Area  
 28.12 kg/m 2 2.1 m 2 Preferred Pharmacy Pharmacy Name Phone [facility] [address] [place] [phone] Your Updated Medication List  
  
   
This list is accurate as of: 1/15/18 11:19 AM.  Always use your most recent med list.  
  
  
  
  
 albuterol 90 mcg/actuation inhaler Commonly known as:  VENTOLIN HFA INHALE TWO PUFFS BY MOUTH EVERY 6 HOURS AS NEEDED FOR WHEEZING FOR  ACUTE  ASTHMA  ATTACK  
  
 budesonide-formoterol 160-4.5 mcg/actuation Hfaa Commonly known as:  SYMBICORT Take 2 Puffs by inhalation two (2) times a day. Indications: BRONCHOSPASM PREVENTION WITH COPD  
  
 chlorthalidone 25 mg tablet Commonly known as:  Lauree Soria Take 1 Tab by mouth daily. Indications: HYPERTENSION  
  
 LORazepam 0.5 mg tablet Commonly known as:  ATIVAN  
TAKE ONE TABLET BY MOUTH EVERY 8 HOURS AS NEEDED FOR ANXIETY  
  
 losartan 100 mg tablet Commonly known as:  COZAAR Take 1 Tab by mouth daily. Indications: hypertension  
  
 metFORMIN  mg tablet Commonly known as:  GLUCOPHAGE XR  
TAKE ONE TABLET BY MOUTH ONCE DAILY WITH  DINNER  Indications: type 2 diabetes mellitus  
  
 metoprolol succinate 50 mg XL tablet Commonly known as:  TOPROL-XL  
TAKE ONE TABLET BY MOUTH ONCE DAILY  
  
 simvastatin 20 mg tablet Commonly known as:  ZOCOR  
TAKE 1 TABLET BY MOUTH NIGHTLY Prescriptions Sent to Pharmacy Refills  
 metFORMIN ER (GLUCOPHAGE XR) 500 mg tablet 3 Sig: TAKE ONE TABLET BY MOUTH ONCE DAILY WITH  DINNER  Indications: type 2 diabetes mellitus Class: Normal  
 Pharmacy: [address] [place] Ph #: [phone] We Performed the Following CBC WITH AUTOMATED DIFF [14844 CPT(R)] COLLECTION VENOUS BLOOD,VENIPUNCTURE [id number] CPT(R)] HEMOGLOBIN A1C WITH EAG [99726 CPT(R)] LIPID PANEL [16079 CPT(R)] METABOLIC PANEL, COMPREHENSIVE [47763 CPT(R)] MICROALBUMIN, UR, RAND W/ MICROALBUMIN/CREA RATIO [id number] CPT(R)] TSH 3RD GENERATION [29450 CPT(R)] Follow-up Instructions Return in about 4 months (around 5/15/2018). To-Do List   
 01/15/2018 Imaging:  DEXA BONE DENSITY STUDY AXIAL   
  
 01/15/2018 Imaging:  [facility] 3D BENNETT W MAMMO BI DX INCL CAD   
  
 02/14/2018 Lab:  OCCULT BLOOD, IMMUNOASSAY (FIT) Introducing [facility]! [employer] introduces [employer]ream patient portal. Now you can access parts of your medical record, email your doctor's office, and request medication refills online. 1. In your internet browser, go to [email or web address] 2. Click on the First Time User? Click Here link in the Sign In box. You will see the New Member Sign Up page. 3. Enter your Virtustream Access Code exactly as it appears below. You will not need to use this code after youve completed the sign-up process. If you do not sign up before the expiration date, you must request a new code. · Virtustream Access Code: [email or web address] Expires: 4/15/2018 11:19 AM 
 
4. Enter the last four digits of your Social Security Number (xxxx) and Date of Birth (mm/dd/yyyy) as indicated and click Submit. You will be taken to the next sign-up page. 5. Create a Virtustream ID. This will be your Virtustream login ID and cannot be changed, so think of one that is secure and easy to remember. 6. Create a Virtustream password. You can change your password at any time. 7. Enter your Password Reset Question and Answer. This can be used at a later time if you forget your password. 8. Enter your e-mail address. You will receive e-mail notification when new information is available in [address] 9. Click Sign Up. You can now view and download portions of your medical record. 10. Click the Download Summary menu link to download a portable copy of your medical information. If you have questions, please visit the Frequently Asked Questions section of the [employer] website. Remember, [employer] is NOT to be used for urgent needs. For medical emergencies, dial 911. Now available from your iPhone and Android! Please provide this summary of care documentation to your next provider. Your primary care clinician is listed as Mariana Ron. If you have any questions after today's visit, please call [phone].

## 2018-01-16 LAB
ALBUMIN SERPL-MCNC: 4.5 G/DL (ref 3.5–4.8)
ALBUMIN/CREAT UR: 10.6 MG/G CREAT (ref 0–30)
ALBUMIN/GLOB SERPL: 1.4 {RATIO} (ref 1.2–2.2)
ALP SERPL-CCNC: 61 IU/L (ref 39–117)
ALT SERPL-CCNC: 10 IU/L (ref 0–32)
AST SERPL-CCNC: 12 IU/L (ref 0–40)
BASOPHILS # BLD AUTO: 0.1 X10E3/UL (ref 0–0.2)
BASOPHILS NFR BLD AUTO: 1 %
BILIRUB SERPL-MCNC: 0.3 MG/DL (ref 0–1.2)
BUN SERPL-MCNC: 18 MG/DL (ref 8–27)
BUN/CREAT SERPL: 25 (ref 12–28)
CALCIUM SERPL-MCNC: 9.1 MG/DL (ref 8.7–10.3)
CHLORIDE SERPL-SCNC: 103 MMOL/L (ref 96–106)
CHOLEST SERPL-MCNC: 249 MG/DL (ref 100–199)
CO2 SERPL-SCNC: 23 MMOL/L (ref 18–29)
CREAT SERPL-MCNC: 0.71 MG/DL (ref 0.57–1)
CREAT UR-MCNC: 113.1 MG/DL
EOSINOPHIL # BLD AUTO: 0.2 X10E3/UL (ref 0–0.4)
EOSINOPHIL NFR BLD AUTO: 2 %
ERYTHROCYTE [DISTWIDTH] IN BLOOD BY AUTOMATED COUNT: 14 % (ref 12.3–15.4)
EST. AVERAGE GLUCOSE BLD GHB EST-MCNC: 137 MG/DL
GLOBULIN SER CALC-MCNC: 3.3 G/DL (ref 1.5–4.5)
GLUCOSE SERPL-MCNC: 82 MG/DL (ref 65–99)
HBA1C MFR BLD: 6.4 % (ref 4.8–5.6)
HCT VFR BLD AUTO: 39.6 % (ref 34–46.6)
HDLC SERPL-MCNC: 46 MG/DL
HGB BLD-MCNC: 13.3 G/DL (ref 11.1–15.9)
IMM GRANULOCYTES # BLD: 0 X10E3/UL (ref 0–0.1)
IMM GRANULOCYTES NFR BLD: 0 %
LDLC SERPL CALC-MCNC: 152 MG/DL (ref 0–99)
LYMPHOCYTES # BLD AUTO: 4.1 X10E3/UL (ref 0.7–3.1)
LYMPHOCYTES NFR BLD AUTO: 34 %
MCH RBC QN AUTO: 29.6 PG (ref 26.6–33)
MCHC RBC AUTO-ENTMCNC: 33.6 G/DL (ref 31.5–35.7)
MCV RBC AUTO: 88 FL (ref 79–97)
MICROALBUMIN UR-MCNC: 12 UG/ML
MONOCYTES # BLD AUTO: 0.7 X10E3/UL (ref 0.1–0.9)
MONOCYTES NFR BLD AUTO: 6 %
NEUTROPHILS # BLD AUTO: 7.1 X10E3/UL (ref 1.4–7)
NEUTROPHILS NFR BLD AUTO: 57 %
PLATELET # BLD AUTO: 311 X10E3/UL (ref 150–379)
POTASSIUM SERPL-SCNC: 4.5 MMOL/L (ref 3.5–5.2)
PROT SERPL-MCNC: 7.8 G/DL (ref 6–8.5)
RBC # BLD AUTO: 4.5 X10E6/UL (ref 3.77–5.28)
SODIUM SERPL-SCNC: 142 MMOL/L (ref 134–144)
TRIGL SERPL-MCNC: 257 MG/DL (ref 0–149)
TSH SERPL DL<=0.005 MIU/L-ACNC: 4.11 UIU/ML (ref 0.45–4.5)
VLDLC SERPL CALC-MCNC: 51 MG/DL (ref 5–40)
WBC # BLD AUTO: 12.1 X10E3/UL (ref 3.4–10.8)

## 2018-01-18 LAB — HEMOCCULT STL QL IA: NEGATIVE

## 2018-01-24 NOTE — PROGRESS NOTES
FIT test negative   CBC no anemia  Elevated WBC (any recent infections ?)  Metabolic panel looks great! Good liver and kidney functions  Area to work on Cholesterol climbing  A healthy eating plan:  \" Emphasizes vegetables, fruits, whole grains, and fat-free or low-fat dairy products  \" Includes lean meats, poultry, fish, beans, eggs, and nuts  \" Limits saturated and trans fats, sodium, and added sugars  \" Controls portion sizes    Thyroid level WNL no changes to current medical management  HGBA1C improving to 6.4 keep up the good work.

## 2018-05-16 ENCOUNTER — OFFICE VISIT (OUTPATIENT)
Dept: FAMILY MEDICINE CLINIC | Age: 73
End: 2018-05-16

## 2018-05-16 ENCOUNTER — TELEPHONE (OUTPATIENT)
Dept: FAMILY MEDICINE CLINIC | Age: 73
End: 2018-05-16

## 2018-05-16 VITALS
BODY MASS INDEX: 27.35 KG/M2 | TEMPERATURE: 98.7 F | WEIGHT: 191 LBS | OXYGEN SATURATION: 95 % | HEART RATE: 101 BPM | SYSTOLIC BLOOD PRESSURE: 150 MMHG | HEIGHT: 70 IN | DIASTOLIC BLOOD PRESSURE: 80 MMHG

## 2018-05-16 DIAGNOSIS — I10 ESSENTIAL HYPERTENSION: Primary | ICD-10-CM

## 2018-05-16 DIAGNOSIS — E08.00 DIABETES MELLITUS DUE TO UNDERLYING CONDITION WITH HYPEROSMOLARITY WITHOUT COMA, WITHOUT LONG-TERM CURRENT USE OF INSULIN (HCC): ICD-10-CM

## 2018-05-16 DIAGNOSIS — Z71.6 ENCOUNTER FOR SMOKING CESSATION COUNSELING: ICD-10-CM

## 2018-05-16 DIAGNOSIS — F17.200 SMOKER: ICD-10-CM

## 2018-05-16 DIAGNOSIS — E78.5 HYPERLIPIDEMIA, UNSPECIFIED HYPERLIPIDEMIA TYPE: ICD-10-CM

## 2018-05-16 RX ORDER — IBUPROFEN 200 MG
1 TABLET ORAL EVERY 24 HOURS
Qty: 42 PATCH | Refills: 0 | Status: SHIPPED | OUTPATIENT
Start: 2018-05-16 | End: 2018-06-27

## 2018-05-16 NOTE — TELEPHONE ENCOUNTER
[facility] [place] today.

## 2018-05-16 NOTE — PATIENT INSTRUCTIONS
Recommend arm cuff  OMRON  For BP  Monitor salt intake   Wait for lab results f to start smoking cessation

## 2018-05-16 NOTE — ACP (ADVANCE CARE PLANNING)
Advance Care Plan or Surrogate Decision Maker documented in medical record. Patient given ACP to take home review/fill out an signed to return to be scanned in chart.

## 2018-05-16 NOTE — MR AVS SNAPSHOT
[address] [place] [address] [place] [address] 
[phone] Patient: Eileen Camilo MRN: [id number] HOC:6/48/5870 Visit Information Date & Time Provider Department Dept. Phone Encounter #  
 5/16/2018  9:30 AM Dulce Maria Espinal NP [facility] [address] [facility] [phone] [id number] Follow-up Instructions Return in about 3 months (around 8/16/2018). Upcoming Health Maintenance Date Due Hepatitis C Screening 1945 EYE EXAM RETINAL OR DILATED Q1 3/17/1957 GLAUCOMA SCREENING Q2Y 3/17/2012 Pneumococcal 65+ Low/Medium Risk (2 of 2 - PPSV23) 2/3/2018 MEDICARE YEARLY EXAM 3/14/2018 HEMOGLOBIN A1C Q6M 7/15/2018 Influenza Age 5 to Adult 8/1/2018 FOOT EXAM Q1 1/15/2019 MICROALBUMIN Q1 1/15/2019 LIPID PANEL Q1 1/15/2019 FOBT Q 1 YEAR AGE 50-75 1/16/2019 BREAST CANCER SCRN MAMMOGRAM 1/30/2020 DTaP/Tdap/Td series (2 - Td) 1/15/2028 Allergies as of 5/16/2018  Review Complete On: 5/16/2018 By: Dulce Maria Espinal NP No Known Allergies Current Immunizations  Reviewed on 2/3/2017 No immunizations on file. Not reviewed this visit You Were Diagnosed With   
  
 Codes Comments Essential hypertension    -  Primary ICD-10-CM: I10 
ICD-9-CM: 401.9 BMI 27.0-27.9,adult     ICD-10-CM: L06.24 ICD-9-CM: V85.23 Diabetes mellitus due to underlying condition with hyperosmolarity without coma, without long-term current use of insulin (HCC)     ICD-10-CM: E08.00 ICD-9-CM: 249.20 Hyperlipidemia, unspecified hyperlipidemia type     ICD-10-CM: E78.5 ICD-9-CM: 272.4 Smoker     ICD-10-CM: F47.987 ICD-9-CM: 305.1 Encounter for smoking cessation counseling     ICD-10-CM: Z71.6, Z72.0 ICD-9-CM: V65.42, 305.1 Vitals  BP Pulse Temp Height(growth percentile) Weight(growth percentile) SpO2  
 150/80 (BP 1 Location: Left arm, BP Patient Position: Sitting) (!) 101 98.7 °F (37.1 °C) (Oral) 5' 10\" (1.778 m) 191 lb (86.6 kg) 95% BMI OB Status Smoking Status 27.41 kg/m2 Postmenopausal Current Every Day Smoker Vitals History BMI and BSA Data Body Mass Index Body Surface Area  
 27.41 kg/m 2 2.07 m 2 Preferred Pharmacy Pharmacy Name Phone [facility] [address] [place] [phone] Your Updated Medication List  
  
   
This list is accurate as of 5/16/18 10:11 AM.  Always use your most recent med list.  
  
  
  
  
 albuterol 90 mcg/actuation inhaler Commonly known as:  VENTOLIN HFA INHALE TWO PUFFS BY MOUTH EVERY 6 HOURS AS NEEDED FOR WHEEZING FOR  ACUTE  ASTHMA  ATTACK  
  
 budesonide-formoterol 160-4.5 mcg/actuation Hfaa Commonly known as:  SYMBICORT Take 2 Puffs by inhalation two (2) times a day. Indications: BRONCHOSPASM PREVENTION WITH COPD  
  
 chlorthalidone 25 mg tablet Commonly known as:  Ulyess Cirri Take 1 Tab by mouth daily. Indications: HYPERTENSION  
  
 LORazepam 0.5 mg tablet Commonly known as:  ATIVAN  
TAKE ONE TABLET BY MOUTH EVERY 8 HOURS AS NEEDED FOR ANXIETY  
  
 losartan 100 mg tablet Commonly known as:  COZAAR Take 1 Tab by mouth daily. Indications: hypertension  
  
 metFORMIN  mg tablet Commonly known as:  GLUCOPHAGE XR  
TAKE ONE TABLET BY MOUTH ONCE DAILY WITH  DINNER  Indications: type 2 diabetes mellitus  
  
 metoprolol succinate 50 mg XL tablet Commonly known as:  TOPROL-XL  
TAKE ONE TABLET BY MOUTH ONCE DAILY  
  
 nicotine 14 mg/24 hr patch Commonly known as:  NICODERM CQ  
1 Patch by TransDERmal route every twenty-four (24) hours for 42 days. Indications: Smoking Cessation  
  
 simvastatin 20 mg tablet Commonly known as:  ZOCOR  
TAKE 1 TABLET BY MOUTH NIGHTLY Prescriptions Sent to Pharmacy  Refills  
 nicotine (NICODERM CQ) 14 mg/24 hr patch 0  
 Si Patch by TransDERmal route every twenty-four (24) hours for 42 days. Indications: Smoking Cessation Class: Normal  
 Pharmacy: [facility] [address] Ph #: [phone] Route: TransDERmal  
  
We Performed the Following CBC WITH AUTOMATED DIFF [54346 CPT(R)] COLLECTION VENOUS BLOOD,VENIPUNCTURE [id number] CPT(R)] HEMOGLOBIN A1C WITH EAG [64262 CPT(R)] LIPID PANEL [51613 CPT(R)] METABOLIC PANEL, COMPREHENSIVE [34745 CPT(R)] Follow-up Instructions Return in about 3 months (around 2018). Patient Instructions Recommend arm cuff  OMRON  For BP Monitor salt intake Wait for lab results f to start smoking cessation Introducing [facility] & HEALTH SERVICES! Hermelindo Sepulveda introduces SAVO patient portal. Now you can access parts of your medical record, email your doctor's office, and request medication refills online. 1. In your internet browser, go to [email or web address] ([email or web address] ([email or web address]) 2. Click on the First Time User? Click Here link in the Sign In box. You will see the New Member Sign Up page. 3. Enter your SAVO Access Code exactly as it appears below. You will not need to use this code after youve completed the sign-up process. If you do not sign up before the expiration date, you must request a new code. · SAVO Access Code: [email or web address] Expires: 2018 10:11 AM 
 
4. Enter the last four digits of your Social Security Number (xxxx) and Date of Birth (mm/dd/yyyy) as indicated and click Submit. You will be taken to the next sign-up page. 5. Create a SAVO ID. This will be your SAVO login ID and cannot be changed, so think of one that is secure and easy to remember. 6. Create a SAVO password. You can change your password at any time. 7. Enter your Password Reset Question and Answer. This can be used at a later time if you forget your password. 8. Enter your e-mail address. You will receive e-mail notification when new information is available in [address] 9. Click Sign Up. You can now view and download portions of your medical record. 10. Click the Download Summary menu link to download a portable copy of your medical information. If you have questions, please visit the Frequently Asked Questions section of the [employer] website. Remember, [employer] is NOT to be used for urgent needs. For medical emergencies, dial 911. Now available from your iPhone and Android! Please provide this summary of care documentation to your next provider. Your primary care clinician is listed as Anuel Foreman. If you have any questions after today's visit, please call [phone].

## 2018-05-16 NOTE — PROGRESS NOTES
1. Have you been to the ER, urgent care clinic since your last visit? Hospitalized since your last visit? No    2. Have you seen or consulted any other health care providers outside of the [address] [place] [address] since your last visit? Include any pap smears or colon screening.  No

## 2018-05-17 LAB
ALBUMIN SERPL-MCNC: 4.4 G/DL (ref 3.5–4.8)
ALBUMIN/GLOB SERPL: 1.3 {RATIO} (ref 1.2–2.2)
ALP SERPL-CCNC: 61 IU/L (ref 39–117)
ALT SERPL-CCNC: 10 IU/L (ref 0–32)
AST SERPL-CCNC: 13 IU/L (ref 0–40)
BASOPHILS # BLD AUTO: 0.1 X10E3/UL (ref 0–0.2)
BASOPHILS NFR BLD AUTO: 1 %
BILIRUB SERPL-MCNC: 0.4 MG/DL (ref 0–1.2)
BUN SERPL-MCNC: 11 MG/DL (ref 8–27)
BUN/CREAT SERPL: 14 (ref 12–28)
CALCIUM SERPL-MCNC: 9 MG/DL (ref 8.7–10.3)
CHLORIDE SERPL-SCNC: 102 MMOL/L (ref 96–106)
CHOLEST SERPL-MCNC: 227 MG/DL (ref 100–199)
CO2 SERPL-SCNC: 25 MMOL/L (ref 18–29)
CREAT SERPL-MCNC: 0.79 MG/DL (ref 0.57–1)
EOSINOPHIL # BLD AUTO: 0.1 X10E3/UL (ref 0–0.4)
EOSINOPHIL NFR BLD AUTO: 1 %
ERYTHROCYTE [DISTWIDTH] IN BLOOD BY AUTOMATED COUNT: 14.1 % (ref 12.3–15.4)
EST. AVERAGE GLUCOSE BLD GHB EST-MCNC: 140 MG/DL
GFR SERPLBLD CREATININE-BSD FMLA CKD-EPI: 76 ML/MIN/1.73
GFR SERPLBLD CREATININE-BSD FMLA CKD-EPI: 87 ML/MIN/1.73
GLOBULIN SER CALC-MCNC: 3.4 G/DL (ref 1.5–4.5)
GLUCOSE SERPL-MCNC: 96 MG/DL (ref 65–99)
HBA1C MFR BLD: 6.5 % (ref 4.8–5.6)
HCT VFR BLD AUTO: 40.5 % (ref 34–46.6)
HDLC SERPL-MCNC: 47 MG/DL
HGB BLD-MCNC: 13.7 G/DL (ref 11.1–15.9)
IMM GRANULOCYTES # BLD: 0 X10E3/UL (ref 0–0.1)
IMM GRANULOCYTES NFR BLD: 0 %
LDLC SERPL CALC-MCNC: 141 MG/DL (ref 0–99)
LYMPHOCYTES # BLD AUTO: 3 X10E3/UL (ref 0.7–3.1)
LYMPHOCYTES NFR BLD AUTO: 31 %
MCH RBC QN AUTO: 29.8 PG (ref 26.6–33)
MCHC RBC AUTO-ENTMCNC: 33.8 G/DL (ref 31.5–35.7)
MCV RBC AUTO: 88 FL (ref 79–97)
MONOCYTES # BLD AUTO: 0.4 X10E3/UL (ref 0.1–0.9)
MONOCYTES NFR BLD AUTO: 4 %
NEUTROPHILS # BLD AUTO: 6.2 X10E3/UL (ref 1.4–7)
NEUTROPHILS NFR BLD AUTO: 63 %
PLATELET # BLD AUTO: 342 X10E3/UL (ref 150–379)
POTASSIUM SERPL-SCNC: 3.8 MMOL/L (ref 3.5–5.2)
PROT SERPL-MCNC: 7.8 G/DL (ref 6–8.5)
RBC # BLD AUTO: 4.59 X10E6/UL (ref 3.77–5.28)
SODIUM SERPL-SCNC: 142 MMOL/L (ref 134–144)
TRIGL SERPL-MCNC: 193 MG/DL (ref 0–149)
VLDLC SERPL CALC-MCNC: 39 MG/DL (ref 5–40)
WBC # BLD AUTO: 9.8 X10E3/UL (ref 3.4–10.8)

## 2018-05-17 NOTE — TELEPHONE ENCOUNTER
She has not been on Lorazepam since 2015 I offered her counseling as a first step. Please clarify that with the patient.

## 2018-05-17 NOTE — TELEPHONE ENCOUNTER
Spoke with patient. Advised of Azul's instructions. She would like refill on Lorazepam for stress due to family issues. Sleeps well at night. Spoke about right upper arm pain into shoulder. Its ongoing ? Arthritis. Fx arm years ago saw Dr Levis Cowden. Has not tried heat or ice. Has been taking ibuprofen 200 mg 4 pills twice a day. Has tried muscle rubs and patches with little relief. Has not done any physical therapy ?

## 2018-05-17 NOTE — TELEPHONE ENCOUNTER
Tried to call patient. No answer @ [phone] (home)   Unable to leave a message due to voicemail box is full.

## 2018-05-18 DIAGNOSIS — M79.601 RIGHT ARM PAIN: ICD-10-CM

## 2018-05-18 DIAGNOSIS — F41.9 ANXIETY: Primary | ICD-10-CM

## 2018-05-18 RX ORDER — BUSPIRONE HYDROCHLORIDE 5 MG/1
5 TABLET ORAL 2 TIMES DAILY
Qty: 60 TAB | Refills: 1 | Status: SHIPPED | OUTPATIENT
Start: 2018-05-18 | End: 2019-02-07 | Stop reason: SDUPTHER

## 2018-05-18 RX ORDER — LORAZEPAM 0.5 MG/1
0.5 TABLET ORAL
Qty: 5 TAB | Refills: 0 | Status: SHIPPED | OUTPATIENT
Start: 2018-05-18 | End: 2018-08-16 | Stop reason: ALTCHOICE

## 2018-05-18 NOTE — TELEPHONE ENCOUNTER
X rays ordered at [address]   Address PT in 3 weeks after trying ice/ heat etc as noted in previous phone note

## 2018-05-20 NOTE — PROGRESS NOTES
Lipid panel improving keep up the good work  \" Emphasizes vegetables, fruits, whole grains, and fat-free or low-fat dairy products  \" Includes lean meats, poultry, fish, beans, eggs, and nuts  \" Limits saturated and trans fats, sodium, and added sugars  \" Controls portion sizes  Metabolic panel good liver and kidney function    Hemoglobin A1C   Slight increase 6.5 average blood glucose 140. Please reinforce and verify she is taking metformin.

## 2018-05-21 ENCOUNTER — TELEPHONE (OUTPATIENT)
Dept: FAMILY MEDICINE CLINIC | Age: 73
End: 2018-05-21

## 2018-05-21 NOTE — TELEPHONE ENCOUNTER
Pt had a missed call and was wondering if a nurse tried calling her? I checked with everyone up front and none of us tried calling. Call Faith Rival back.

## 2018-06-05 RX ORDER — LOSARTAN POTASSIUM 100 MG/1
TABLET ORAL
Qty: 90 TAB | Refills: 2 | Status: SHIPPED | OUTPATIENT
Start: 2018-06-05 | End: 2019-03-01 | Stop reason: SDUPTHER

## 2018-06-05 NOTE — TELEPHONE ENCOUNTER
Pt needs a refill on her Losartan, it was supposed to be filled on Monday but it was sent to the wrong provider. Pt is out of meds and needs more asap.  Send to [facility] in [place]

## 2018-08-16 ENCOUNTER — TELEPHONE (OUTPATIENT)
Dept: FAMILY MEDICINE CLINIC | Age: 73
End: 2018-08-16

## 2018-08-16 ENCOUNTER — OFFICE VISIT (OUTPATIENT)
Dept: FAMILY MEDICINE CLINIC | Age: 73
End: 2018-08-16

## 2018-08-16 VITALS
WEIGHT: 193.8 LBS | TEMPERATURE: 97.3 F | RESPIRATION RATE: 18 BRPM | SYSTOLIC BLOOD PRESSURE: 154 MMHG | HEIGHT: 70 IN | OXYGEN SATURATION: 95 % | BODY MASS INDEX: 27.75 KG/M2 | DIASTOLIC BLOOD PRESSURE: 90 MMHG | HEART RATE: 97 BPM

## 2018-08-16 DIAGNOSIS — I10 ESSENTIAL HYPERTENSION: Primary | ICD-10-CM

## 2018-08-16 DIAGNOSIS — Z00.00 ENCOUNTER FOR MEDICARE ANNUAL WELLNESS EXAM: ICD-10-CM

## 2018-08-16 DIAGNOSIS — E08.00 DIABETES MELLITUS DUE TO UNDERLYING CONDITION WITH HYPEROSMOLARITY WITHOUT COMA, WITHOUT LONG-TERM CURRENT USE OF INSULIN (HCC): ICD-10-CM

## 2018-08-16 DIAGNOSIS — Z11.59 ENCOUNTER FOR HEPATITIS C SCREENING TEST FOR LOW RISK PATIENT: ICD-10-CM

## 2018-08-16 DIAGNOSIS — Z71.6 ENCOUNTER FOR SMOKING CESSATION COUNSELING: ICD-10-CM

## 2018-08-16 RX ORDER — IBUPROFEN 200 MG
TABLET ORAL
COMMUNITY
End: 2019-01-01

## 2018-08-16 RX ORDER — VARENICLINE TARTRATE 25 MG
0.5 KIT ORAL
Qty: 1 DOSE PACK | Refills: 1 | Status: SHIPPED | OUTPATIENT
Start: 2018-08-16 | End: 2018-09-15

## 2018-08-16 RX ORDER — VARENICLINE TARTRATE 1 MG/1
TABLET, FILM COATED ORAL
Qty: 30 TAB | Refills: 0 | Status: SHIPPED | OUTPATIENT
Start: 2018-08-16 | End: 2018-11-14

## 2018-08-16 NOTE — PROGRESS NOTES
This is the Subsequent Medicare Annual Wellness Exam, performed 12 months or more after the Initial AWV or the last Subsequent AWV    I have reviewed the patient's medical history in detail and updated the computerized patient record. History     Past Medical History:   Diagnosis Date    Chronic obstructive pulmonary disease ([address]     Diabetes ([facility] [facility]     Environmental allergies      5     para 5    Hyperlipidemia     Hypertension     Hypokalemia     Menopause       Past Surgical History:   Procedure Laterality Date    HX CATARACT REMOVAL  2013. Current Outpatient Prescriptions   Medication Sig Dispense Refill    losartan (COZAAR) 100 mg tablet TAKE ONE TABLET BY MOUTH ONCE DAILY --  INDICATIONS  HYPERTENSION 90 Tab 2    metFORMIN ER (GLUCOPHAGE XR) 500 mg tablet TAKE ONE TABLET BY MOUTH ONCE DAILY WITH  DINNER  Indications: type 2 diabetes mellitus 90 Tab 3    albuterol (VENTOLIN HFA) 90 mcg/actuation inhaler INHALE TWO PUFFS BY MOUTH EVERY 6 HOURS AS NEEDED FOR WHEEZING FOR  ACUTE  ASTHMA  ATTACK 1 Inhaler 3    metoprolol succinate (TOPROL-XL) 50 mg XL tablet TAKE ONE TABLET BY MOUTH ONCE DAILY 90 Tab 3    LORazepam (ATIVAN) 0.5 mg tablet Take 1 Tab by mouth daily as needed for Anxiety (1 tab per 24 hour day). Indications: anxiety for procedure or legal concerns 5 Tab 0    busPIRone (BUSPAR) 5 mg tablet Take 1 Tab by mouth two (2) times a day. Indications: Generalized Anxiety Disorder 60 Tab 1    budesonide-formoterol (SYMBICORT) 160-4.5 mcg/actuation HFAA Take 2 Puffs by inhalation two (2) times a day. Indications: BRONCHOSPASM PREVENTION WITH COPD 1 Inhaler 6    simvastatin (ZOCOR) 20 mg tablet TAKE 1 TABLET BY MOUTH NIGHTLY 30 Tab 11    chlorthalidone (HYGROTEN) 25 mg tablet Take 1 Tab by mouth daily. Indications: HYPERTENSION 30 Tab 6     No Known Allergies  Family History   Problem Relation Age of Onset    Cancer Mother      COLON CA ?  Cancer Father      ? UNKNOWN TYPE Social History   Substance Use Topics    Smoking status: Current Every Day Smoker    Smokeless tobacco: Never Used    Alcohol use Yes     Patient Active Problem List   Diagnosis Code    Diabetes ([facility] [address] E11.9    Hypertension I10    Hyperlipidemia E78.5    Arthritis, degenerative M19.90    Chronic bronchitis ([facility] J42    Smoker F17.200       Depression Risk Factor Screening:     PHQ over the last two weeks 5/16/2018   PHQ Not Done Medical Reason (indicate in comments)   Little interest or pleasure in doing things Not at all   Feeling down, depressed, irritable, or hopeless Not at all   Total Score PHQ 2 0     Alcohol Risk Factor Screening: You do not drink alcohol or very rarely. Functional Ability and Level of Safety:   Hearing Loss  Hearing is good. Whisper Test done with normal results. Activities of Daily Living  The home contains: handrails  Patient does total self care    Fall Risk  Fall Risk Assessment, last 12 mths 5/16/2018   Able to walk? Yes   Fall in past 12 months? No       Abuse Screen  Patient is not abused    Cognitive Screening   Evaluation of Cognitive Function:  Has your family/caregiver stated any concerns about your memory: no  Normal    Patient Care Team   Patient Care Team:  Ifeanyi Jorgensen NP as PCP - General (Nurse Practitioner)    Assessment/Plan   Education and counseling provided:  Are appropriate based on today's review and evaluation    Diagnoses and all orders for this visit:    1. Essential hypertension  -     CBC WITH AUTOMATED DIFF  -     LIPID PANEL  -     METABOLIC PANEL, COMPREHENSIVE  -     HEPATITIS C AB    2. BMI 28.0-28.9,adult    3. Encounter for smoking cessation counseling    4. Diabetes mellitus due to underlying condition with hyperosmolarity without coma, without long-term current use of insulin (HCC)  -     CBC WITH AUTOMATED DIFF  -     LIPID PANEL  -     METABOLIC PANEL, COMPREHENSIVE  -     HEPATITIS C AB    5.  Encounter for hepatitis C screening test for low risk patient  -     HEPATITIS C AB    Other orders  -     varenicline (CHANTIX STARTER WILLIE) 0.5 mg (11)- 1 mg (42) DsPk; Take 0.5 mg by mouth daily (after breakfast) for 30 days. Indications: Smoking Cessation      Health Maintenance Due   Topic Date Due    Hepatitis C Screening  1945    EYE EXAM RETINAL OR DILATED Q1  03/17/1957    GLAUCOMA SCREENING Q2Y  03/17/2012    Pneumococcal 65+ Low/Medium Risk (2 of 2 - PPSV23) 02/03/2018    MEDICARE YEARLY EXAM  03/14/2018    Influenza Age 9 to Adult  08/01/2018     1. Have you been to the ER, urgent care clinic since your last visit? Hospitalized since your last visit? No    2. Have you seen or consulted any other health care providers outside of the [address] [place] [address] since your last visit? Include any pap smears or colon screening.  No     Jo Castillo NP-C

## 2018-08-16 NOTE — MR AVS SNAPSHOT
[address] [place] [address] [place] [address] 
[phone] Patient: Joel Gore MRN: [id number] Visit Information Date & Time Provider Department Dept. Phone Encounter #  
 8/16/2018  9:00 AM Romie Pichardo NP [facility] [address] [facility] [phone] [id number] Upcoming Health Maintenance Date Due  
 EYE EXAM RETINAL OR DILATED Q1 3/17/1957 GLAUCOMA SCREENING Q2Y 3/17/2012 Pneumococcal 65+ Low/Medium Risk (2 of 2 - PPSV23) 2/3/2018 MEDICARE YEARLY EXAM 3/14/2018 Influenza Age 5 to Adult 8/1/2018 HEMOGLOBIN A1C Q6M 11/16/2018 FOOT EXAM Q1 1/15/2019 MICROALBUMIN Q1 1/15/2019 FOBT Q 1 YEAR AGE 50-75 1/16/2019 LIPID PANEL Q1 5/16/2019 BREAST CANCER SCRN MAMMOGRAM 1/30/2020 DTaP/Tdap/Td series (2 - Td) 1/15/2028 Allergies as of 8/16/2018  Review Complete On: 8/16/2018 By: Chaim Anderson RN No Known Allergies Current Immunizations  Reviewed on 2/3/2017 No immunizations on file. Not reviewed this visit You Were Diagnosed With   
  
 Codes Comments Essential hypertension    -  Primary ICD-10-CM: I10 
ICD-9-CM: 401.9 BMI 28.0-28.9,adult     ICD-10-CM: R66.57 
ICD-9-CM: V85.24 Encounter for smoking cessation counseling     ICD-10-CM: Z71.6, Z72.0 ICD-9-CM: V65.42, 305.1 Diabetes mellitus due to underlying condition with hyperosmolarity without coma, without long-term current use of insulin (HCC)     ICD-10-CM: E08.00 ICD-9-CM: 249.20 Encounter for hepatitis C screening test for low risk patient     ICD-10-CM: Z11.59 
ICD-9-CM: V73.89 Vitals BP Pulse Temp Resp Height(growth percentile) 154/90 (BP 1 Location: Left arm, BP Patient Position: Sitting) 97 97.3 °F (36.3 °C) (Temporal) 18 5' 9.5\" (1.765 m) Weight(growth percentile) SpO2 BMI OB Status Smoking Status 193 lb 12.8 oz (87.9 kg) 95% 28.21 kg/m2 Postmenopausal Current Every Day Smoker Vitals History BMI and BSA Data Body Mass Index Body Surface Area  
 28.21 kg/m 2 2.08 m 2 Preferred Pharmacy Pharmacy Name Phone [address] [place] [phone] Your Updated Medication List  
  
   
This list is accurate as of 8/16/18 10:01 AM.  Always use your most recent med list.  
  
  
  
  
 albuterol 90 mcg/actuation inhaler Commonly known as:  VENTOLIN HFA INHALE TWO PUFFS BY MOUTH EVERY 6 HOURS AS NEEDED FOR WHEEZING FOR  ACUTE  ASTHMA  ATTACK  
  
 budesonide-formoterol 160-4.5 mcg/actuation Hfaa Commonly known as:  SYMBICORT Take 2 Puffs by inhalation two (2) times a day. Indications: BRONCHOSPASM PREVENTION WITH COPD  
  
 busPIRone 5 mg tablet Commonly known as:  BUSPAR Take 1 Tab by mouth two (2) times a day. Indications: Generalized Anxiety Disorder  
  
 chlorthalidone 25 mg tablet Commonly known as:  Valeda Lemuel Take 1 Tab by mouth daily. Indications: HYPERTENSION  
  
 ibuprofen 200 mg tablet Commonly known as:  MOTRIN Take  by mouth every six (6) hours as needed for Pain.  
  
 losartan 100 mg tablet Commonly known as:  COZAAR  
TAKE ONE TABLET BY MOUTH ONCE DAILY --  INDICATIONS  HYPERTENSION  
  
 metFORMIN  mg tablet Commonly known as:  GLUCOPHAGE XR  
TAKE ONE TABLET BY MOUTH ONCE DAILY WITH  DINNER  Indications: type 2 diabetes mellitus  
  
 metoprolol succinate 50 mg XL tablet Commonly known as:  TOPROL-XL  
TAKE ONE TABLET BY MOUTH ONCE DAILY  
  
 simvastatin 20 mg tablet Commonly known as:  ZOCOR  
TAKE 1 TABLET BY MOUTH NIGHTLY  
  
 varenicline 0.5 mg (11)- 1 mg (42) Dspk Commonly known as:  CHANTIX STARTER WILLIE Take 0.5 mg by mouth daily (after breakfast) for 30 days. Indications: Smoking Cessation Prescriptions Sent to Pharmacy Refills varenicline (CHANTIX STARTER WILLIE) 0.5 mg (11)- 1 mg (42) DsPk 1 Sig: Take 0.5 mg by mouth daily (after breakfast) for 30 days. Indications: Smoking Cessation Class: Normal  
 Pharmacy: [facility] [address], [address] [place]  #: [phone] Route: Oral  
  
We Performed the Following CBC WITH AUTOMATED DIFF [11492 CPT(R)] HEPATITIS C AB [19221 CPT(R)] LIPID PANEL [65513 CPT(R)] METABOLIC PANEL, COMPREHENSIVE [16695 CPT(R)] Introducing [facility]! [employer] introduces [employer] patient portal. Now you can access parts of your medical record, email your doctor's office, and request medication refills online. 1. In your internet browser, go to [email or web address] 2. Click on the First Time User? Click Here link in the Sign In box. You will see the New Member Sign Up page. 3. Enter your [employer] Access Code exactly as it appears below. You will not need to use this code after youve completed the sign-up process. If you do not sign up before the expiration date, you must request a new code. · [employer] Access Code: [email or web address] Expires: 11/14/2018 10:01 AM 
 
4. Enter the last four digits of your Social Security Number (xxxx) and Date of Birth (mm/dd/yyyy) as indicated and click Submit. You will be taken to the next sign-up page. 5. Create a imojit ID. This will be your [employer] login ID and cannot be changed, so think of one that is secure and easy to remember. 6. Create a [employer] password. You can change your password at any time. 7. Enter your Password Reset Question and Answer. This can be used at a later time if you forget your password. 8. Enter your e-mail address. You will receive e-mail notification when new information is available in [address] 9. Click Sign Up. You can now view and download portions of your medical record.  
10. Click the Download Summary menu link to download a portable copy of your medical information. If you have questions, please visit the Frequently Asked Questions section of the [employer] website. Remember, [employer] is NOT to be used for urgent needs. For medical emergencies, dial 911. Now available from your iPhone and Android! Please provide this summary of care documentation to your next provider. Your primary care clinician is listed as Lars Saba. If you have any questions after today's visit, please call [phone].

## 2018-08-17 LAB
ALBUMIN SERPL-MCNC: 4.4 G/DL (ref 3.5–4.8)
ALBUMIN/GLOB SERPL: 1.5 {RATIO} (ref 1.2–2.2)
ALP SERPL-CCNC: 60 IU/L (ref 39–117)
ALT SERPL-CCNC: 12 IU/L (ref 0–32)
AST SERPL-CCNC: 13 IU/L (ref 0–40)
BASOPHILS # BLD AUTO: 0 X10E3/UL (ref 0–0.2)
BASOPHILS NFR BLD AUTO: 0 %
BILIRUB SERPL-MCNC: 0.2 MG/DL (ref 0–1.2)
BUN SERPL-MCNC: 10 MG/DL (ref 8–27)
BUN/CREAT SERPL: 14 (ref 12–28)
CALCIUM SERPL-MCNC: 9.4 MG/DL (ref 8.7–10.3)
CHLORIDE SERPL-SCNC: 103 MMOL/L (ref 96–106)
CHOLEST SERPL-MCNC: 223 MG/DL (ref 100–199)
CO2 SERPL-SCNC: 24 MMOL/L (ref 20–29)
CREAT SERPL-MCNC: 0.74 MG/DL (ref 0.57–1)
EOSINOPHIL # BLD AUTO: 0.2 X10E3/UL (ref 0–0.4)
EOSINOPHIL NFR BLD AUTO: 2 %
ERYTHROCYTE [DISTWIDTH] IN BLOOD BY AUTOMATED COUNT: 13.9 % (ref 12.3–15.4)
GLOBULIN SER CALC-MCNC: 3 G/DL (ref 1.5–4.5)
GLUCOSE SERPL-MCNC: 91 MG/DL (ref 65–99)
HCT VFR BLD AUTO: 38.9 % (ref 34–46.6)
HCV AB S/CO SERPL IA: 0.1 S/CO RATIO (ref 0–0.9)
HDLC SERPL-MCNC: 52 MG/DL
HGB BLD-MCNC: 13.1 G/DL (ref 11.1–15.9)
IMM GRANULOCYTES # BLD: 0 X10E3/UL (ref 0–0.1)
IMM GRANULOCYTES NFR BLD: 0 %
LDLC SERPL CALC-MCNC: 141 MG/DL (ref 0–99)
LYMPHOCYTES # BLD AUTO: 3.4 X10E3/UL (ref 0.7–3.1)
LYMPHOCYTES NFR BLD AUTO: 33 %
MCH RBC QN AUTO: 30.3 PG (ref 26.6–33)
MCHC RBC AUTO-ENTMCNC: 33.7 G/DL (ref 31.5–35.7)
MCV RBC AUTO: 90 FL (ref 79–97)
MONOCYTES # BLD AUTO: 0.3 X10E3/UL (ref 0.1–0.9)
MONOCYTES NFR BLD AUTO: 3 %
NEUTROPHILS # BLD AUTO: 6.4 X10E3/UL (ref 1.4–7)
NEUTROPHILS NFR BLD AUTO: 62 %
PLATELET # BLD AUTO: 312 X10E3/UL (ref 150–379)
POTASSIUM SERPL-SCNC: 4 MMOL/L (ref 3.5–5.2)
PROT SERPL-MCNC: 7.4 G/DL (ref 6–8.5)
RBC # BLD AUTO: 4.33 X10E6/UL (ref 3.77–5.28)
SODIUM SERPL-SCNC: 144 MMOL/L (ref 134–144)
TRIGL SERPL-MCNC: 151 MG/DL (ref 0–149)
VLDLC SERPL CALC-MCNC: 30 MG/DL (ref 5–40)
WBC # BLD AUTO: 10.3 X10E3/UL (ref 3.4–10.8)

## 2018-08-17 NOTE — TELEPHONE ENCOUNTER
Called and Kourtney Shafer, transferred to [facility], she was given the Chantix next refill maintenance instructions. She stated OK and thanks.

## 2018-08-17 NOTE — PROGRESS NOTES
Subjective:     Orquidea Romero is a 72 y.o. female who presents today with the following:  Chief Complaint   Patient presents with   [facility] Annual Wellness Visit     subsequent       Patient Active Problem List   Diagnosis Code    Diabetes ([facility] E11.9    Hypertension I10    Hyperlipidemia E78.5    Arthritis, degenerative M19.90    Chronic bronchitis ([facility] [facility] J42    Smoker F17.200         COMPLIANT WITH MEDICATION:   HTN; Denies chest pain, dyspnea, palpitations, headache and blurred vision. Blood pressure usually normotensive. Diabetes. Sugars controlled moderately she does not check her blood glucose at home . Declines  Hypoglycemia: none  Tolerating current treatment moderately  Current medications include diet  oral agent (monotherapy): metformin (generic)    Lab Results   Component Value Date/Time    Hemoglobin A1c 6.5 (H) 05/16/2018 10:11 AM    Hemoglobin A1c 6.4 (H) 01/15/2018 11:09 AM    Hemoglobin A1c 6.7 (H) 02/03/2017 11:39 AM    Glucose 91 08/16/2018 09:53 AM    Glucose  (A) 10/03/2014 11:50 AM    Microalb/Creat ratio (ug/mg creat.) 10.6 01/15/2018 11:09 AM    LDL, calculated 141 (H) 08/16/2018 09:53 AM    Creatinine 0.74 08/16/2018 09:53 AM     Lab Results   Component Value Date/Time    Microalb/Creat ratio (ug/mg creat.) 10.6 01/15/2018 11:09 AM       Last eye exam performed   Greater than 1 year ago and was normal and . With corrective lenses    Last foot exam performed greather than 1 year ago.   warm, good capillary refill        ROS:  Gen: denies fever, chills, fatigue, weight loss, weight gain  HEENT:denies blurry vision, nasal congestion, sore throat  Resp: denies dypsnea, cough, wheezing  CV: denies chest pain radiating to the jaws or arms, palpitations, lower extremity edema  Abd: denies nausea, vomiting, diarrhea, constipation  Neuro: denies numbness/tingling  Endo: denies polyuria, polydipsia, heat/cold intolerance  Heme: no lymphadenopathy    No Known Allergies      Current Outpatient Prescriptions:     ibuprofen (MOTRIN) 200 mg tablet, Take  by mouth every six (6) hours as needed for Pain., Disp: , Rfl:     varenicline (CHANTIX STARTER WILLIE) 0.5 mg (11)- 1 mg (42) DsPk, Take 0.5 mg by mouth daily (after breakfast) for 30 days. Indications: Smoking Cessation, Disp: 1 Dose Pack, Rfl: 1    losartan (COZAAR) 100 mg tablet, TAKE ONE TABLET BY MOUTH ONCE DAILY --  INDICATIONS  HYPERTENSION, Disp: 90 Tab, Rfl: 2    metFORMIN ER (GLUCOPHAGE XR) 500 mg tablet, TAKE ONE TABLET BY MOUTH ONCE DAILY WITH  DINNER  Indications: type 2 diabetes mellitus, Disp: 90 Tab, Rfl: 3    albuterol (VENTOLIN HFA) 90 mcg/actuation inhaler, INHALE TWO PUFFS BY MOUTH EVERY 6 HOURS AS NEEDED FOR WHEEZING FOR  ACUTE  ASTHMA  ATTACK, Disp: 1 Inhaler, Rfl: 3    metoprolol succinate (TOPROL-XL) 50 mg XL tablet, TAKE ONE TABLET BY MOUTH ONCE DAILY, Disp: 90 Tab, Rfl: 3    varenicline (CHANTIX) 1 mg tablet, For next refill, Disp: 30 Tab, Rfl: 0    busPIRone (BUSPAR) 5 mg tablet, Take 1 Tab by mouth two (2) times a day. Indications: Generalized Anxiety Disorder, Disp: 60 Tab, Rfl: 1    budesonide-formoterol (SYMBICORT) 160-4.5 mcg/actuation HFAA, Take 2 Puffs by inhalation two (2) times a day. Indications: BRONCHOSPASM PREVENTION WITH COPD, Disp: 1 Inhaler, Rfl: 6    simvastatin (ZOCOR) 20 mg tablet, TAKE 1 TABLET BY MOUTH NIGHTLY, Disp: 30 Tab, Rfl: 11    chlorthalidone (HYGROTEN) 25 mg tablet, Take 1 Tab by mouth daily. Indications: HYPERTENSION, Disp: 30 Tab, Rfl: 6    Past Medical History:   Diagnosis Date    Chronic obstructive pulmonary disease ([address]     Diabetes ([facility]     Environmental allergies      5     para 5    Hyperlipidemia     Hypertension     Hypokalemia     Menopause        Past Surgical History:   Procedure Laterality Date    HX CATARACT REMOVAL  2013.        History   Smoking Status    Current Every Day Smoker   Smokeless Tobacco    Never Used       Social History     Social History  Marital status: SINGLE     Spouse name: N/A    Number of children: N/A    Years of education: N/A     Social History Main Topics    Smoking status: Current Every Day Smoker    Smokeless tobacco: Never Used    Alcohol use Yes    Drug use: No    Sexual activity: Not Asked     Other Topics Concern     Service No    Blood Transfusions No    Caffeine Concern Yes     [employer] Occupational Exposure No    Hobby Hazards No    Sleep Concern No    Stress Concern No    Weight Concern No    Special Diet No    Back Care No    Exercise No    Bike Helmet No    Seat Belt Yes    Self-Exams Yes     Social History Narrative       Family History   Problem Relation Age of Onset    Cancer Mother      COLON CA ?  Cancer Father      ? UNKNOWN TYPE         Objective:     Visit Vitals    /90 (BP 1 Location: Left arm, BP Patient Position: Sitting)    Pulse 97    Temp 97.3 °F (36.3 °C) (Temporal)    Resp 18    Ht 5' 9.5\" (1.765 m)    Wt 193 lb 12.8 oz (87.9 kg)    SpO2 95%    BMI 28.21 kg/m2     Body mass index is 28.21 kg/(m^2). General: Alert and oriented. No acute distress. Well nourished  HEENT :  Ears:TMs are normal. Canals are clear. Eyes: pupils equal, round, react to light and accommodation. Extra ocular movements intact. Nose: patent. Mouth and throat is clear. Neck:supple full range of motion no thyromegaly. Trachea midline, No carotid bruits. No significant lymphadenopathy  Lungs[de-identified] clear to auscultation without wheezes, rales, or rhonchi. Heart :RRR, S1 & S2 are normal intensity. No murmur; no gallop  Abdomen: bowel sounds active. No tenderness, guarding, rebound, masses, hepatic or spleen enlargement  Back: no CVA tenderness. Extremities: without clubbing, cyanosis, or edema  Pulses: radial and femoral pulses are normal  Neuro: HMF intact.  Cranial nerves II through XII grossly normal.  Motor: is 5 over 5 and symmetrical.   Deep tendon reflexes: +2 equal    Results for orders placed or performed in visit on 08/16/18   CBC WITH AUTOMATED DIFF   Result Value Ref Range    WBC 10.3 3.4 - 10.8 x10E3/uL    RBC 4.33 3.77 - 5.28 x10E6/uL    HGB 13.1 11.1 - 15.9 g/dL    HCT 38.9 34.0 - 46.6 %    MCV 90 79 - 97 fL    MCH 30.3 26.6 - 33.0 pg    MCHC 33.7 31.5 - 35.7 g/dL    RDW 13.9 12.3 - 15.4 %    PLATELET 395 631 - 495 x10E3/uL    NEUTROPHILS 62 Not Estab. %    Lymphocytes 33 Not Estab. %    MONOCYTES 3 Not Estab. %    EOSINOPHILS 2 Not Estab. %    BASOPHILS 0 Not Estab. %    ABS. NEUTROPHILS 6.4 1.4 - 7.0 x10E3/uL    Abs Lymphocytes 3.4 (H) 0.7 - 3.1 x10E3/uL    ABS. MONOCYTES 0.3 0.1 - 0.9 x10E3/uL    ABS. EOSINOPHILS 0.2 0.0 - 0.4 x10E3/uL    ABS. BASOPHILS 0.0 0.0 - 0.2 x10E3/uL    IMMATURE GRANULOCYTES 0 Not Estab. %    ABS. IMM. GRANS. 0.0 0.0 - 0.1 x10E3/uL   LIPID PANEL   Result Value Ref Range    Cholesterol, total 223 (H) 100 - 199 mg/dL    Triglyceride 151 (H) 0 - 149 mg/dL    HDL Cholesterol 52 >39 mg/dL    VLDL, calculated 30 5 - 40 mg/dL    LDL, calculated 141 (H) 0 - 99 mg/dL   METABOLIC PANEL, COMPREHENSIVE   Result Value Ref Range    Glucose 91 65 - 99 mg/dL    BUN 10 8 - 27 mg/dL    Creatinine 0.74 0.57 - 1.00 mg/dL    GFR est non-AA 82 >59 mL/min/1.73    GFR est AA 94 >59 mL/min/1.73    BUN/Creatinine ratio 14 12 - 28    Sodium 144 134 - 144 mmol/L    Potassium 4.0 3.5 - 5.2 mmol/L    Chloride 103 96 - 106 mmol/L    CO2 24 20 - 29 mmol/L    Calcium 9.4 8.7 - 10.3 mg/dL    Protein, total 7.4 6.0 - 8.5 g/dL    Albumin 4.4 3.5 - 4.8 g/dL    GLOBULIN, TOTAL 3.0 1.5 - 4.5 g/dL    A-G Ratio 1.5 1.2 - 2.2    Bilirubin, total 0.2 0.0 - 1.2 mg/dL    Alk.  phosphatase 60 39 - 117 IU/L    AST (SGOT) 13 0 - 40 IU/L    ALT (SGPT) 12 0 - 32 IU/L   HEPATITIS C AB   Result Value Ref Range    Hep C Virus Ab 0.1 0.0 - 0.9 s/co ratio       Results for orders placed or performed in visit on 08/16/18   CBC WITH AUTOMATED DIFF   Result Value Ref Range    WBC 10.3 3.4 - 10.8 x10E3/uL    RBC 4.33 3.77 - 5.28 x10E6/uL    HGB 13.1 11.1 - 15.9 g/dL    HCT 38.9 34.0 - 46.6 %    MCV 90 79 - 97 fL    MCH 30.3 26.6 - 33.0 pg    MCHC 33.7 31.5 - 35.7 g/dL    RDW 13.9 12.3 - 15.4 %    PLATELET 747 097 - 992 x10E3/uL    NEUTROPHILS 62 Not Estab. %    Lymphocytes 33 Not Estab. %    MONOCYTES 3 Not Estab. %    EOSINOPHILS 2 Not Estab. %    BASOPHILS 0 Not Estab. %    ABS. NEUTROPHILS 6.4 1.4 - 7.0 x10E3/uL    Abs Lymphocytes 3.4 (H) 0.7 - 3.1 x10E3/uL    ABS. MONOCYTES 0.3 0.1 - 0.9 x10E3/uL    ABS. EOSINOPHILS 0.2 0.0 - 0.4 x10E3/uL    ABS. BASOPHILS 0.0 0.0 - 0.2 x10E3/uL    IMMATURE GRANULOCYTES 0 Not Estab. %    ABS. IMM. GRANS. 0.0 0.0 - 0.1 x10E3/uL    Narrative    Performed at:  [address]  [id number]  : Ahmet Kumar MD, Phone:  [phone]   LIPID PANEL   Result Value Ref Range    Cholesterol, total 223 (H) 100 - 199 mg/dL    Triglyceride 151 (H) 0 - 149 mg/dL    HDL Cholesterol 52 >39 mg/dL    VLDL, calculated 30 5 - 40 mg/dL    LDL, calculated 141 (H) 0 - 99 mg/dL    Narrative    Performed at:  [address]  [id number]  : Ahmet Kumar MD, Phone:  [phone]   METABOLIC PANEL, COMPREHENSIVE   Result Value Ref Range    Glucose 91 65 - 99 mg/dL    BUN 10 8 - 27 mg/dL    Creatinine 0.74 0.57 - 1.00 mg/dL    GFR est non-AA 82 >59 mL/min/1.73    GFR est AA 94 >59 mL/min/1.73    BUN/Creatinine ratio 14 12 - 28    Sodium 144 134 - 144 mmol/L    Potassium 4.0 3.5 - 5.2 mmol/L    Chloride 103 96 - 106 mmol/L    CO2 24 20 - 29 mmol/L    Calcium 9.4 8.7 - 10.3 mg/dL    Protein, total 7.4 6.0 - 8.5 g/dL    Albumin 4.4 3.5 - 4.8 g/dL    GLOBULIN, TOTAL 3.0 1.5 - 4.5 g/dL    A-G Ratio 1.5 1.2 - 2.2    Bilirubin, total 0.2 0.0 - 1.2 mg/dL    Alk.  phosphatase 60 39 - 117 IU/L    AST (SGOT) 13 0 - 40 IU/L    ALT (SGPT) 12 0 - 32 IU/L    Narrative    Performed at:  [address] [place] [address] [facility] [address], [place]  [id number]  : Brenda Borja MD, Phone:  [phone]   HEPATITIS C AB   Result Value Ref Range    Hep C Virus Ab 0.1 0.0 - 0.9 s/co ratio    Narrative    Performed at:  [address], [place]  [id number]  : Brenda Borja MD, Phone:  [phone]       Assessment/ Plan:     1. BMI 28.0-28.9,adult      2. Encounter for smoking cessation counseling  chantix strater pack    3. Essential hypertension    - CBC WITH AUTOMATED DIFF  - LIPID PANEL  - METABOLIC PANEL, COMPREHENSIVE  - HEPATITIS C AB    4. Diabetes mellitus due to underlying condition with hyperosmolarity without coma, without long-term current use of insulin (HCC)    - CBC WITH AUTOMATED DIFF  - LIPID PANEL  - METABOLIC PANEL, COMPREHENSIVE  - HEPATITIS C AB    5. Encounter for hepatitis C screening test for low risk patient    - HEPATITIS C AB      Orders Placed This Encounter    CBC WITH AUTOMATED DIFF    LIPID PANEL    METABOLIC PANEL, COMPREHENSIVE    HEPATITIS C AB    ibuprofen (MOTRIN) 200 mg tablet     Sig: Take  by mouth every six (6) hours as needed for Pain.  varenicline (CHANTIX STARTER WILLIE) 0.5 mg (11)- 1 mg (42) DsPk     Sig: Take 0.5 mg by mouth daily (after breakfast) for 30 days. Indications: Smoking Cessation     Dispense:  1 Dose Pack     Refill:  1         Verbal and written instructions (see AVS) provided.  Patient expresses understanding of diagnosis and treatment plan. Health Maintenance Due   Topic Date Due    EYE EXAM RETINAL OR DILATED Q1  03[birth year]/1957    GLAUCOMA SCREENING Q2Y  03/17/2012    Pneumococcal 65+ Low/Medium Risk (2 of 2 - PPSV23) 02/03/2018    MEDICARE YEARLY EXAM  03/14/2018    Influenza Age 9 to Adult  08/01/2018         Follow-up Disposition:  Return in about 4 months (around 12/16/2018).       JOSE Romero

## 2018-08-17 NOTE — PROGRESS NOTES
Subjective:      Sarthak Green is an 72 y.o.  female seen for discussion regarding smoking cessation. She began smoking several  years ago. She currently smokes 1 packs per day. She has attempted to quit smoking in the past, most recently a few months ago month ago. Best success quitting using willpower. Barriers to quitting include fear of failing again. Patient Active Problem List   Diagnosis Code    Diabetes ([facility] [address] E11.9    Hypertension I10    Hyperlipidemia E78.5    Arthritis, degenerative M19.90    Chronic bronchitis (HCC) J42    Smoker F17.200     Current Outpatient Prescriptions   Medication Sig Dispense Refill    ibuprofen (MOTRIN) 200 mg tablet Take  by mouth every six (6) hours as needed for Pain.  varenicline (CHANTIX STARTER WILLIE) 0.5 mg (11)- 1 mg (42) DsPk Take 0.5 mg by mouth daily (after breakfast) for 30 days. Indications: Smoking Cessation 1 Dose Pack 1    losartan (COZAAR) 100 mg tablet TAKE ONE TABLET BY MOUTH ONCE DAILY --  INDICATIONS  HYPERTENSION 90 Tab 2    metFORMIN ER (GLUCOPHAGE XR) 500 mg tablet TAKE ONE TABLET BY MOUTH ONCE DAILY WITH  DINNER  Indications: type 2 diabetes mellitus 90 Tab 3    albuterol (VENTOLIN HFA) 90 mcg/actuation inhaler INHALE TWO PUFFS BY MOUTH EVERY 6 HOURS AS NEEDED FOR WHEEZING FOR  ACUTE  ASTHMA  ATTACK 1 Inhaler 3    metoprolol succinate (TOPROL-XL) 50 mg XL tablet TAKE ONE TABLET BY MOUTH ONCE DAILY 90 Tab 3    varenicline (CHANTIX) 1 mg tablet For next refill 30 Tab 0    busPIRone (BUSPAR) 5 mg tablet Take 1 Tab by mouth two (2) times a day. Indications: Generalized Anxiety Disorder 60 Tab 1    budesonide-formoterol (SYMBICORT) 160-4.5 mcg/actuation HFAA Take 2 Puffs by inhalation two (2) times a day. Indications: BRONCHOSPASM PREVENTION WITH COPD 1 Inhaler 6    simvastatin (ZOCOR) 20 mg tablet TAKE 1 TABLET BY MOUTH NIGHTLY 30 Tab 11    chlorthalidone (HYGROTEN) 25 mg tablet Take 1 Tab by mouth daily.  Indications: HYPERTENSION 30 Tab 6     No Known Allergies  Past Medical History:   Diagnosis Date    Chronic obstructive pulmonary disease ([facility] [address]     Diabetes ([facility] [address]     Environmental allergies      5     para 5    Hyperlipidemia     Hypertension     Hypokalemia     Menopause      Past Surgical History:   Procedure Laterality Date    HX CATARACT REMOVAL  2013. Family History   Problem Relation Age of Onset    Cancer Mother      COLON CA ?  Cancer Father      ? UNKNOWN TYPE     Social History   Substance Use Topics    Smoking status: Current Every Day Smoker    Smokeless tobacco: Never Used    Alcohol use Yes        Review of Systems  Pertinent items are noted in HPI. Objective:     Visit Vitals    /90 (BP 1 Location: Left arm, BP Patient Position: Sitting)    Pulse 97    Temp 97.3 °F (36.3 °C) (Temporal)    Resp 18    Ht 5' 9.5\" (1.765 m)    Wt 193 lb 12.8 oz (87.9 kg)    SpO2 95%    BMI 28.21 kg/m2      Physical Exam:   Refer to general note    Assessment/Plan:     Tobacco Use/Cessation. I assessed Bill Irving to be in action stage with respect to tobacco use. I advised patient to quit, and offered support. Educational material distributed. Discussed current use pattern. chantix starter pack    ICD-10-CM ICD-9-CM    1. Essential hypertension I10 401.9 CBC WITH AUTOMATED DIFF      LIPID PANEL      METABOLIC PANEL, COMPREHENSIVE      HEPATITIS C AB   2. BMI 28.0-28.9,adult Z68.28 V85.24    3. Encounter for smoking cessation counseling Z71.6 V65.42     Z72.0 305.1    4. Diabetes mellitus due to underlying condition with hyperosmolarity without coma, without long-term current use of insulin (HCC) E08.00 249.20 CBC WITH AUTOMATED DIFF      LIPID PANEL      METABOLIC PANEL, COMPREHENSIVE      HEPATITIS C AB   5. Encounter for hepatitis C screening test for low risk patient Z11.59 V73.89 HEPATITIS C AB     Diagnoses and all orders for this visit:    1.  Essential hypertension  - CBC WITH AUTOMATED DIFF  -     LIPID PANEL  -     METABOLIC PANEL, COMPREHENSIVE  -     HEPATITIS C AB    2. BMI 28.0-28.9,adult    3. Encounter for smoking cessation counseling    4. Diabetes mellitus due to underlying condition with hyperosmolarity without coma, without long-term current use of insulin (HCC)  -     CBC WITH AUTOMATED DIFF  -     LIPID PANEL  -     METABOLIC PANEL, COMPREHENSIVE  -     HEPATITIS C AB    5. Encounter for hepatitis C screening test for low risk patient  -     HEPATITIS C AB    Other orders  -     varenicline (CHANTIX STARTER WILLIE) 0.5 mg (11)- 1 mg (42) DsPk; Take 0.5 mg by mouth daily (after breakfast) for 30 days. Indications: Smoking Cessation      Follow-up Disposition:  Return in about 4 months (around 12/16/2018). Ulysess Covert NP-C

## 2018-08-24 NOTE — PROGRESS NOTES
CBC no anemia  Lipid panel noticeable improvement lipid panel keep up the good work  Metabolic panel looks great!  Good liver and kidney functions

## 2018-11-06 RX ORDER — ALBUTEROL SULFATE 90 UG/1
AEROSOL, METERED RESPIRATORY (INHALATION)
Qty: 18 INHALER | Refills: 3 | Status: SHIPPED | OUTPATIENT
Start: 2018-11-06 | End: 2019-01-01 | Stop reason: SDUPTHER

## 2019-03-01 RX ORDER — METFORMIN HYDROCHLORIDE 500 MG/1
TABLET, EXTENDED RELEASE ORAL
Qty: 90 TAB | Refills: 3 | Status: SHIPPED | OUTPATIENT
Start: 2019-03-01 | End: 2020-01-01

## 2019-03-01 RX ORDER — LOSARTAN POTASSIUM 100 MG/1
TABLET ORAL
Qty: 90 TAB | Refills: 2 | Status: SHIPPED | OUTPATIENT
Start: 2019-03-01 | End: 2020-01-01 | Stop reason: SDUPTHER

## 2019-06-11 PROBLEM — E11.21 TYPE 2 DIABETES WITH NEPHROPATHY (HCC): Status: ACTIVE | Noted: 2019-06-11

## 2020-01-01 ENCOUNTER — PATIENT OUTREACH (OUTPATIENT)
Dept: CASE MANAGEMENT | Age: 75
End: 2020-01-01

## 2020-01-01 ENCOUNTER — HOSPITAL ENCOUNTER (INPATIENT)
Age: 75
LOS: 1 days | Discharge: ANOTHER HOSPITAL | DRG: 189 | End: 2020-02-27
Attending: EMERGENCY MEDICINE | Admitting: INTERNAL MEDICINE
Payer: MEDICARE

## 2020-01-01 VITALS
TEMPERATURE: 98 F | BODY MASS INDEX: 22.96 KG/M2 | HEART RATE: 92 BPM | OXYGEN SATURATION: 94 % | HEIGHT: 69 IN | WEIGHT: 154.98 LBS | DIASTOLIC BLOOD PRESSURE: 74 MMHG | SYSTOLIC BLOOD PRESSURE: 144 MMHG | RESPIRATION RATE: 20 BRPM

## 2020-01-01 DIAGNOSIS — J96.01 ACUTE RESPIRATORY FAILURE WITH HYPOXIA (HCC): Primary | ICD-10-CM

## 2020-01-01 DIAGNOSIS — R91.8 LUNG MASS: ICD-10-CM

## 2020-01-01 DIAGNOSIS — J42 CHRONIC BRONCHITIS, UNSPECIFIED CHRONIC BRONCHITIS TYPE (HCC): ICD-10-CM

## 2020-01-01 LAB
ANION GAP SERPL CALC-SCNC: 7 MMOL/L (ref 5–15)
BASOPHILS # BLD: 0 K/UL (ref 0–0.1)
BASOPHILS NFR BLD: 0 % (ref 0–1)
BUN SERPL-MCNC: 12 MG/DL (ref 6–20)
BUN/CREAT SERPL: 19 (ref 12–20)
CALCIUM SERPL-MCNC: 8.5 MG/DL (ref 8.5–10.1)
CHLORIDE SERPL-SCNC: 110 MMOL/L (ref 97–108)
CO2 SERPL-SCNC: 20 MMOL/L (ref 21–32)
CREAT SERPL-MCNC: 0.62 MG/DL (ref 0.55–1.02)
DIFFERENTIAL METHOD BLD: ABNORMAL
EOSINOPHIL # BLD: 0 K/UL (ref 0–0.4)
EOSINOPHIL NFR BLD: 0 % (ref 0–7)
ERYTHROCYTE [DISTWIDTH] IN BLOOD BY AUTOMATED COUNT: 14.2 % (ref 11.5–14.5)
EST. AVERAGE GLUCOSE BLD GHB EST-MCNC: 128 MG/DL
GLUCOSE BLD STRIP.AUTO-MCNC: 130 MG/DL (ref 65–100)
GLUCOSE BLD STRIP.AUTO-MCNC: 134 MG/DL (ref 65–100)
GLUCOSE BLD STRIP.AUTO-MCNC: 170 MG/DL (ref 65–100)
GLUCOSE BLD STRIP.AUTO-MCNC: 206 MG/DL (ref 65–100)
GLUCOSE SERPL-MCNC: 178 MG/DL (ref 65–100)
HBA1C MFR BLD: 6.1 % (ref 4–5.6)
HCT VFR BLD AUTO: 28.6 % (ref 35–47)
HGB BLD-MCNC: 9.5 G/DL (ref 11.5–16)
IMM GRANULOCYTES # BLD AUTO: 0.2 K/UL (ref 0–0.04)
IMM GRANULOCYTES NFR BLD AUTO: 1 % (ref 0–0.5)
LACTATE SERPL-SCNC: 1.1 MMOL/L (ref 0.4–2)
LYMPHOCYTES # BLD: 1.8 K/UL (ref 0.8–3.5)
LYMPHOCYTES NFR BLD: 8 % (ref 12–49)
MCH RBC QN AUTO: 27.5 PG (ref 26–34)
MCHC RBC AUTO-ENTMCNC: 33.2 G/DL (ref 30–36.5)
MCV RBC AUTO: 82.9 FL (ref 80–99)
MONOCYTES # BLD: 0.7 K/UL (ref 0–1)
MONOCYTES NFR BLD: 3 % (ref 5–13)
NEUTS SEG # BLD: 20.1 K/UL (ref 1.8–8)
NEUTS SEG NFR BLD: 88 % (ref 32–75)
NRBC # BLD: 0 K/UL (ref 0–0.01)
NRBC BLD-RTO: 0 PER 100 WBC
PLATELET # BLD AUTO: 551 K/UL (ref 150–400)
PMV BLD AUTO: 8.5 FL (ref 8.9–12.9)
POTASSIUM SERPL-SCNC: 3.2 MMOL/L (ref 3.5–5.1)
RBC # BLD AUTO: 3.45 M/UL (ref 3.8–5.2)
RBC MORPH BLD: ABNORMAL
SERVICE CMNT-IMP: ABNORMAL
SODIUM SERPL-SCNC: 137 MMOL/L (ref 136–145)
WBC # BLD AUTO: 22.8 K/UL (ref 3.6–11)

## 2020-01-01 PROCEDURE — 99285 EMERGENCY DEPT VISIT HI MDM: CPT

## 2020-01-01 PROCEDURE — 83036 HEMOGLOBIN GLYCOSYLATED A1C: CPT

## 2020-01-01 PROCEDURE — 80048 BASIC METABOLIC PNL TOTAL CA: CPT

## 2020-01-01 PROCEDURE — 74011000250 HC RX REV CODE- 250: Performed by: INTERNAL MEDICINE

## 2020-01-01 PROCEDURE — 74011636637 HC RX REV CODE- 636/637: Performed by: INTERNAL MEDICINE

## 2020-01-01 PROCEDURE — 74011250636 HC RX REV CODE- 250/636: Performed by: INTERNAL MEDICINE

## 2020-01-01 PROCEDURE — 77030038269 HC DRN EXT URIN PURWCK BARD -A

## 2020-01-01 PROCEDURE — 65660000000 HC RM CCU STEPDOWN

## 2020-01-01 PROCEDURE — 74011000250 HC RX REV CODE- 250: Performed by: EMERGENCY MEDICINE

## 2020-01-01 PROCEDURE — 74011250637 HC RX REV CODE- 250/637: Performed by: NURSE PRACTITIONER

## 2020-01-01 PROCEDURE — 77010033678 HC OXYGEN DAILY

## 2020-01-01 PROCEDURE — 85025 COMPLETE CBC W/AUTO DIFF WBC: CPT

## 2020-01-01 PROCEDURE — 74011250637 HC RX REV CODE- 250/637: Performed by: INTERNAL MEDICINE

## 2020-01-01 PROCEDURE — 77030018744 HC FLOMTR PEAK FLO -A

## 2020-01-01 PROCEDURE — 94640 AIRWAY INHALATION TREATMENT: CPT

## 2020-01-01 PROCEDURE — 74011000258 HC RX REV CODE- 258: Performed by: INTERNAL MEDICINE

## 2020-01-01 PROCEDURE — 94660 CPAP INITIATION&MGMT: CPT

## 2020-01-01 PROCEDURE — 77030029684 HC NEB SM VOL KT MONA -A

## 2020-01-01 PROCEDURE — 82962 GLUCOSE BLOOD TEST: CPT

## 2020-01-01 PROCEDURE — 5A09357 ASSISTANCE WITH RESPIRATORY VENTILATION, LESS THAN 24 CONSECUTIVE HOURS, CONTINUOUS POSITIVE AIRWAY PRESSURE: ICD-10-PCS | Performed by: EMERGENCY MEDICINE

## 2020-01-01 PROCEDURE — 36415 COLL VENOUS BLD VENIPUNCTURE: CPT

## 2020-01-01 PROCEDURE — 83605 ASSAY OF LACTIC ACID: CPT

## 2020-01-01 RX ORDER — INSULIN LISPRO 100 [IU]/ML
INJECTION, SOLUTION INTRAVENOUS; SUBCUTANEOUS
Status: DISCONTINUED | OUTPATIENT
Start: 2020-01-01 | End: 2020-01-01 | Stop reason: HOSPADM

## 2020-01-01 RX ORDER — LOSARTAN POTASSIUM 100 MG/1
TABLET ORAL
Qty: 90 TAB | Refills: 2 | Status: SHIPPED | OUTPATIENT
Start: 2020-01-01 | End: 2020-01-01

## 2020-01-01 RX ORDER — ONDANSETRON 2 MG/ML
4 INJECTION INTRAMUSCULAR; INTRAVENOUS
Status: DISCONTINUED | OUTPATIENT
Start: 2020-01-01 | End: 2020-01-01 | Stop reason: HOSPADM

## 2020-01-01 RX ORDER — SODIUM CHLORIDE 0.9 % (FLUSH) 0.9 %
5-40 SYRINGE (ML) INJECTION AS NEEDED
Status: DISCONTINUED | OUTPATIENT
Start: 2020-01-01 | End: 2020-01-01 | Stop reason: HOSPADM

## 2020-01-01 RX ORDER — HEPARIN SODIUM 5000 [USP'U]/ML
5000 INJECTION, SOLUTION INTRAVENOUS; SUBCUTANEOUS EVERY 8 HOURS
Status: DISCONTINUED | OUTPATIENT
Start: 2020-01-01 | End: 2020-01-01 | Stop reason: HOSPADM

## 2020-01-01 RX ORDER — METFORMIN HYDROCHLORIDE 500 MG/1
TABLET, EXTENDED RELEASE ORAL
Qty: 90 TAB | Refills: 0 | Status: SHIPPED | OUTPATIENT
Start: 2020-01-01 | End: 2020-01-01

## 2020-01-01 RX ORDER — LOSARTAN POTASSIUM 100 MG/1
100 TABLET ORAL DAILY
Status: DISCONTINUED | OUTPATIENT
Start: 2020-01-01 | End: 2020-01-01 | Stop reason: HOSPADM

## 2020-01-01 RX ORDER — METOPROLOL SUCCINATE 50 MG/1
50 TABLET, EXTENDED RELEASE ORAL DAILY
Status: DISCONTINUED | OUTPATIENT
Start: 2020-01-01 | End: 2020-01-01 | Stop reason: HOSPADM

## 2020-01-01 RX ORDER — MEGESTROL ACETATE 40 MG/1
40 TABLET ORAL 2 TIMES DAILY
Status: DISCONTINUED | OUTPATIENT
Start: 2020-01-01 | End: 2020-01-01 | Stop reason: HOSPADM

## 2020-01-01 RX ORDER — MAGNESIUM SULFATE 100 %
4 CRYSTALS MISCELLANEOUS AS NEEDED
Status: DISCONTINUED | OUTPATIENT
Start: 2020-01-01 | End: 2020-01-01 | Stop reason: HOSPADM

## 2020-01-01 RX ORDER — ARFORMOTEROL TARTRATE 15 UG/2ML
15 SOLUTION RESPIRATORY (INHALATION)
Status: DISCONTINUED | OUTPATIENT
Start: 2020-01-01 | End: 2020-01-01

## 2020-01-01 RX ORDER — SODIUM CHLORIDE 0.9 % (FLUSH) 0.9 %
5-40 SYRINGE (ML) INJECTION EVERY 8 HOURS
Status: DISCONTINUED | OUTPATIENT
Start: 2020-01-01 | End: 2020-01-01 | Stop reason: HOSPADM

## 2020-01-01 RX ORDER — UMECLIDINIUM BROMIDE AND VILANTEROL TRIFENATATE 62.5; 25 UG/1; UG/1
1 POWDER RESPIRATORY (INHALATION) DAILY
Qty: 1 INHALER | Refills: 0 | Status: SHIPPED | OUTPATIENT
Start: 2020-01-01

## 2020-01-01 RX ORDER — ARFORMOTEROL TARTRATE 15 UG/2ML
15 SOLUTION RESPIRATORY (INHALATION)
Status: DISCONTINUED | OUTPATIENT
Start: 2020-01-01 | End: 2020-01-01 | Stop reason: HOSPADM

## 2020-01-01 RX ORDER — BUDESONIDE AND FORMOTEROL FUMARATE DIHYDRATE 160; 4.5 UG/1; UG/1
2 AEROSOL RESPIRATORY (INHALATION) 2 TIMES DAILY
Qty: 1 INHALER | Refills: 6 | Status: SHIPPED | OUTPATIENT
Start: 2020-01-01 | End: 2020-01-01

## 2020-01-01 RX ORDER — IPRATROPIUM BROMIDE AND ALBUTEROL SULFATE 2.5; .5 MG/3ML; MG/3ML
3 SOLUTION RESPIRATORY (INHALATION)
Status: DISCONTINUED | OUTPATIENT
Start: 2020-01-01 | End: 2020-01-01 | Stop reason: HOSPADM

## 2020-01-01 RX ORDER — POTASSIUM CHLORIDE 750 MG/1
40 TABLET, FILM COATED, EXTENDED RELEASE ORAL
Status: COMPLETED | OUTPATIENT
Start: 2020-01-01 | End: 2020-01-01

## 2020-01-01 RX ORDER — METFORMIN HYDROCHLORIDE 500 MG/1
TABLET, EXTENDED RELEASE ORAL
Qty: 90 TAB | Refills: 0 | Status: SHIPPED | OUTPATIENT
Start: 2020-01-01

## 2020-01-01 RX ORDER — ATORVASTATIN CALCIUM 20 MG/1
20 TABLET, FILM COATED ORAL DAILY
Status: DISCONTINUED | OUTPATIENT
Start: 2020-01-01 | End: 2020-01-01 | Stop reason: HOSPADM

## 2020-01-01 RX ORDER — ACETAMINOPHEN 325 MG/1
650 TABLET ORAL
Status: DISCONTINUED | OUTPATIENT
Start: 2020-01-01 | End: 2020-01-01 | Stop reason: HOSPADM

## 2020-01-01 RX ORDER — POTASSIUM CHLORIDE 20 MEQ/1
40 TABLET, EXTENDED RELEASE ORAL ONCE
Status: COMPLETED | OUTPATIENT
Start: 2020-01-01 | End: 2020-01-01

## 2020-01-01 RX ORDER — DEXTROSE 50 % IN WATER (D50W) INTRAVENOUS SYRINGE
12.5-25 AS NEEDED
Status: DISCONTINUED | OUTPATIENT
Start: 2020-01-01 | End: 2020-01-01 | Stop reason: HOSPADM

## 2020-01-01 RX ORDER — IPRATROPIUM BROMIDE 0.5 MG/2.5ML
0.5 SOLUTION RESPIRATORY (INHALATION)
Status: DISCONTINUED | OUTPATIENT
Start: 2020-01-01 | End: 2020-01-01

## 2020-01-01 RX ADMIN — POTASSIUM CHLORIDE 40 MEQ: 20 TABLET, EXTENDED RELEASE ORAL at 19:37

## 2020-01-01 RX ADMIN — ARFORMOTEROL TARTRATE 15 MCG: 15 SOLUTION RESPIRATORY (INHALATION) at 07:38

## 2020-01-01 RX ADMIN — MEGESTROL ACETATE 40 MG: 40 TABLET ORAL at 09:10

## 2020-01-01 RX ADMIN — INSULIN LISPRO 2 UNITS: 100 INJECTION, SOLUTION INTRAVENOUS; SUBCUTANEOUS at 21:46

## 2020-01-01 RX ADMIN — IPRATROPIUM BROMIDE 0.5 MG: 0.5 SOLUTION RESPIRATORY (INHALATION) at 02:33

## 2020-01-01 RX ADMIN — SODIUM CHLORIDE 3.38 G: 900 INJECTION, SOLUTION INTRAVENOUS at 19:48

## 2020-01-01 RX ADMIN — PIPERACILLIN AND TAZOBACTAM 3.38 G: 3; .375 INJECTION, POWDER, LYOPHILIZED, FOR SOLUTION INTRAVENOUS at 17:21

## 2020-01-01 RX ADMIN — Medication 10 ML: at 22:00

## 2020-01-01 RX ADMIN — IPRATROPIUM BROMIDE AND ALBUTEROL SULFATE 3 ML: .5; 3 SOLUTION RESPIRATORY (INHALATION) at 02:34

## 2020-01-01 RX ADMIN — HEPARIN SODIUM 5000 UNITS: 5000 INJECTION INTRAVENOUS; SUBCUTANEOUS at 05:01

## 2020-01-01 RX ADMIN — HEPARIN SODIUM 5000 UNITS: 5000 INJECTION INTRAVENOUS; SUBCUTANEOUS at 11:51

## 2020-01-01 RX ADMIN — MEGESTROL ACETATE 40 MG: 40 TABLET ORAL at 22:57

## 2020-01-01 RX ADMIN — Medication 10 ML: at 06:00

## 2020-01-01 RX ADMIN — IPRATROPIUM BROMIDE 0.5 MG: 0.5 SOLUTION RESPIRATORY (INHALATION) at 21:34

## 2020-01-01 RX ADMIN — ATORVASTATIN CALCIUM 20 MG: 20 TABLET, FILM COATED ORAL at 08:41

## 2020-01-01 RX ADMIN — INSULIN LISPRO 2 UNITS: 100 INJECTION, SOLUTION INTRAVENOUS; SUBCUTANEOUS at 08:40

## 2020-01-01 RX ADMIN — PIPERACILLIN AND TAZOBACTAM 3.38 G: 3; .375 INJECTION, POWDER, LYOPHILIZED, FOR SOLUTION INTRAVENOUS at 02:03

## 2020-01-01 RX ADMIN — HEPARIN SODIUM 5000 UNITS: 5000 INJECTION INTRAVENOUS; SUBCUTANEOUS at 19:19

## 2020-01-01 RX ADMIN — Medication 10 ML: at 14:27

## 2020-01-01 RX ADMIN — MEGESTROL ACETATE 40 MG: 40 TABLET ORAL at 17:18

## 2020-01-01 RX ADMIN — PIPERACILLIN AND TAZOBACTAM 3.38 G: 3; .375 INJECTION, POWDER, LYOPHILIZED, FOR SOLUTION INTRAVENOUS at 09:13

## 2020-01-01 RX ADMIN — IPRATROPIUM BROMIDE AND ALBUTEROL SULFATE 3 ML: .5; 3 SOLUTION RESPIRATORY (INHALATION) at 07:38

## 2020-01-01 RX ADMIN — ARFORMOTEROL TARTRATE 15 MCG: 15 SOLUTION RESPIRATORY (INHALATION) at 20:03

## 2020-01-01 RX ADMIN — HEPARIN SODIUM 5000 UNITS: 5000 INJECTION INTRAVENOUS; SUBCUTANEOUS at 18:50

## 2020-01-01 RX ADMIN — METOPROLOL SUCCINATE 50 MG: 50 TABLET, EXTENDED RELEASE ORAL at 08:41

## 2020-01-01 RX ADMIN — IPRATROPIUM BROMIDE AND ALBUTEROL SULFATE 3 ML: .5; 3 SOLUTION RESPIRATORY (INHALATION) at 21:34

## 2020-01-01 RX ADMIN — POTASSIUM CHLORIDE 40 MEQ: 750 TABLET, FILM COATED, EXTENDED RELEASE ORAL at 09:10

## 2020-01-01 RX ADMIN — LOSARTAN POTASSIUM 100 MG: 100 TABLET, FILM COATED ORAL at 08:41

## 2020-01-01 RX ADMIN — IPRATROPIUM BROMIDE AND ALBUTEROL SULFATE 3 ML: .5; 3 SOLUTION RESPIRATORY (INHALATION) at 13:56

## 2020-02-26 PROBLEM — J96.01 ACUTE RESPIRATORY FAILURE WITH HYPOXIA (HCC): Status: ACTIVE | Noted: 2020-01-01

## 2020-02-26 PROBLEM — R06.02 SOB (SHORTNESS OF BREATH): Status: ACTIVE | Noted: 2020-01-01

## 2020-02-26 NOTE — ED PROVIDER NOTES
EMERGENCY DEPARTMENT HISTORY AND PHYSICAL EXAM 
 
 
Date: 2/26/2020 Patient Name: Mulu Lr History of Presenting Illness Chief Complaint Patient presents with  Transfer Of Care  
  sob History Provided By: Patient HPI: Mulu Lr, 69 y.o. female with PMHx significant for COPD, diabetes, hypertension, hyperlipidemia, recently diagnosed lung mass who presents as a transfer from [address] [place] [address] for hypoxic respiratory failure. Patient was diagnosed with a lung mass 4 days ago and had planned to follow-up with oncology and pulmonary, however symptoms got worse so she presented to [address] [place] [address]. Reportedly on EMS arrival at her home she was satting in the 80s on 2 L nasal cannula. She was also febrile on arrival at [address] [place] [address]. She received antibiotics and a CT scan showed occlusion of the left mainstem bronchus due to mass. She was transferred to [facility] for further management. On arrival in the emergency department, patient is comfortable appearing on BiPAP. She states that her shortness of breath is improved. PCP: Stefan Lizarraga NP There are no other complaints, changes, or physical findings at this time. Current Facility-Administered Medications Medication Dose Route Frequency Provider Last Rate Last Dose  albuterol-ipratropium (DUO-NEB) 2.5 MG-0.5 MG/3 ML  3 mL Nebulization Q6H RT Yung Cruz MD   3 mL at 02/26/20 2134  
 arformoteroL (BROVANA) neb solution 15 mcg  15 mcg Nebulization BID RT Yung Cruz MD   Stopped at 02/26/20 2000  insulin lispro (HUMALOG) injection   SubCUTAneous AC&HS Yung Cruz MD   2 Units at 02/26/20 2146  
 glucose chewable tablet 16 g  4 Tab Oral PRN Yung Cruz MD      
 dextrose (D50W) injection syrg 12.5-25 g  12.5-25 g IntraVENous PRN Yung Cruz MD      
 glucagon (GLUCAGEN) injection 1 mg  1 mg IntraMUSCular PRN Yung Cruz MD      
  losartan (COZAAR) tablet 100 mg  100 mg Oral DAILY Graciela Walsh MD      
 megestrol (MEGACE) tablet 40 mg  40 mg Oral BID Graciela Walsh MD   40 mg at 20  metoprolol succinate (TOPROL-XL) XL tablet 50 mg  50 mg Oral DAILY Graciela Walsh MD      
 atorvastatin (LIPITOR) tablet 20 mg  20 mg Oral DAILY Graciela Walsh MD      
 sodium chloride (NS) flush 5-40 mL  5-40 mL IntraVENous Q8H Graciela Walsh MD   10 mL at 20 2200  
 sodium chloride (NS) flush 5-40 mL  5-40 mL IntraVENous PRN Graciela Walsh MD      
 dextrose (D50W) injection syrg 12.5-25 g  12.5-25 g IntraVENous PRN Graciela Walsh MD      
 ondansetron [facility]) injection 4 mg  4 mg IntraVENous Q6H PRN Graciela Walsh MD      
 heparin (porcine) injection 5,000 Units  5,000 Units SubCUTAneous Q8H Graciela Walsh MD   5,000 Units at 20  ipratropium (ATROVENT) 0.02 % nebulizer solution 0.5 mg  0.5 mg Nebulization Q6H RT Catlett, Marica Bloch, MD   0.5 mg at 20  
 arformoteroL (BROVANA) neb solution 15 mcg  15 mcg Nebulization BID RT Salome Urbano MD   Stopped at 20  piperacillin-tazobactam (ZOSYN) 3.375 g in 0.9% sodium chloride (MBP/ADV) 100 mL  3.375 g IntraVENous Q8H Graciela Walsh MD      
 
Past History Past Medical History: 
Past Medical History:  
Diagnosis Date  Chronic obstructive pulmonary disease ([address]  Diabetes ([facility] [address]  Environmental allergies   5   
 para 5  
 Hyperlipidemia  Hypertension  Hypokalemia  Menopause Past Surgical History: 
Past Surgical History:  
Procedure Laterality Date  HX CATARACT REMOVAL  2013. Family History: 
Family History Problem Relation Age of Onset  Cancer Mother COLON CA ?  Cancer Father ? UNKNOWN TYPE  
 Diabetes Son   
 
Social History: 
Social History Tobacco Use  Smoking status: Current Every Day Smoker  Smokeless tobacco: Never Used Substance Use Topics  Alcohol use: Yes  Drug use: No  
 
Allergies: 
No Known Allergies Review of Systems Review of Systems Constitutional: Negative for chills and fever. HENT: Negative for congestion, rhinorrhea and sore throat. Respiratory: Positive for shortness of breath. Negative for cough. Cardiovascular: Negative for chest pain. Gastrointestinal: Negative for abdominal pain, nausea and vomiting. Genitourinary: Negative for dysuria and urgency. Skin: Negative for rash. Neurological: Negative for dizziness, light-headedness and headaches. All other systems reviewed and are negative. Physical Exam  
Physical Exam 
Vitals signs and nursing note reviewed. Constitutional:   
   General: She is not in acute distress. Appearance: She is well-developed. HENT:  
   Head: Normocephalic and atraumatic. Eyes:  
   Conjunctiva/sclera: Conjunctivae normal.  
   Pupils: Pupils are equal, round, and reactive to light. Neck: Musculoskeletal: Normal range of motion. Cardiovascular:  
   Rate and Rhythm: Normal rate and regular rhythm. Pulmonary:  
   Effort: Pulmonary effort is normal. No respiratory distress. Breath sounds: No stridor. Examination of the left-upper field reveals decreased breath sounds. Examination of the left-middle field reveals decreased breath sounds. Examination of the left-lower field reveals decreased breath sounds. Decreased breath sounds present. Abdominal:  
   General: There is no distension. Palpations: Abdomen is soft. Tenderness: There is no abdominal tenderness. Musculoskeletal: Normal range of motion. Skin: 
   General: Skin is warm and dry. Neurological:  
   Mental Status: She is alert and oriented to person, place, and time. Diagnostic Study Results Labs - Recent Results (from the past 12 hour(s)) GLUCOSE, POC Collection Time: 02/26/20  9:01 PM  
Result Value Ref Range Glucose (POC) 206 (H) 65 - 100 mg/dL Performed by Estefany Sesay Radiologic Studies - No orders to display Ct Chest Wo Cont Result Date: 2/26/2020 IMPRESSION: 1. Presence of abnormal soft tissue density associated with the majority of the left hemithorax with evidence of associated volume loss. This is primarily secondary to atelectasis/collapse of the left lung secondary to the mass lesion which obstructs the left mainstem bronchus. A left pleural effusion is likely. No pneumothorax detected. 2. Evidence of mediastinal adenopathy. 3. Normal appearance of the right lung. Xr Chest [facility] Result Date: 2/26/2020 IMPRESSION: Opacification of the left hemithorax with evidence of associated volume loss as described above. Medical Decision Making I am the first provider for this patient. I reviewed the vital signs, available nursing notes, past medical history, past surgical history, family history and social history. Vital Signs-Reviewed the patient's vital signs. Patient Vitals for the past 12 hrs: 
 Temp Pulse Resp BP SpO2  
02/26/20 2229 98 °F (36.7 °C) 89 22 145/78 95 % 02/26/20 2136     97 % 02/26/20 2007 97.5 °F (36.4 °C) 89 25 145/78 92 % 02/26/20 1920 97.7 °F (36.5 °C) 85 28 (!) 156/92 95 % 02/26/20 1830  87 23  95 % 02/26/20 1637 96.8 °F (36 °C) 89 28 147/84 97 % 02/26/20 1632     97 % Pulse Oximetry Analysis - 97% on bipap Records Reviewed: Nursing Notes and Old Medical Records Provider Notes (Medical Decision Making):  
Patient presents as a transfer for hypoxic respiratory failure in the setting of lung mass with left mainstem bronchus obstruction. On arrival, patient comfortable appearing on BiPAP. Will discuss with hospitalist for admission. ED Course:  
Initial assessment performed.  The patients presenting problems have been discussed, and they are in agreement with the care plan formulated and outlined with them. I have encouraged them to ask questions as they arise throughout their visit. Discussed with Dr. Susanna Marley, hospitalist, request that we discussed with pulmonary prior to admitting at this facility Patient discussed with Dr. Earnestine Dakin, pulmonary, ok to stay here, plan for bronchoscopy Critical Care: 
none Disposition: 
 
Admission Note: 
Patient is being admitted to the hospital by Dr. Susanna Marley, Service: Hospitalist.  The results of their tests and reasons for their admission have been discussed with them and available family. They convey agreement and understanding for the need to be admitted and for their admission diagnosis. Diagnosis Clinical Impression: 1. Acute respiratory failure with hypoxia ([address] 2. Lung mass This note will not be viewable in [address] Please note that this dictation was completed with [employer], the computer voice recognition software. Quite often unanticipated grammatical, syntax, homophones, and other interpretive errors are inadvertently transcribed by the computer software. Please disregard these errors. Please excuse any errors that have escaped final proofreading

## 2020-02-26 NOTE — PROGRESS NOTES
Courtesy note Patient had appt to see me today at 3 PM for evaluation of her new lung mass but ended up in the ER due to her progressive shortness of breath. The following is a synopsis of the information we had collected about her probable lung cancer. She had preented to the ER on 2/22/20 with complaints of generalized progressive weakness, cough and chills without fever for a month. She has a history of COPD and was noted to have shortness of breath, fatigue, and weight loss. She had abeen started on megace with some improvment and was granzing on small amounts of food throughout the day. Neisha Dobson on 12/30/19 reported new lesion LLL. CT of chest with contrast suggested for further evaluation. CT of chest with contrast on 1/8/20 reported abnormal soft tissue lesion in posterior aspect of the left lung base 4.9 x 4.7 cm, suggestive of mass in midst of chronic LLL consolidation), mediastinal adenopathy (2 prominent left sided mediastinal LNs 2.1 x 1.6 cm and 1.8 cm; precarinal node 1.6 x 1.2 cm),  focal soft tissue density associated with distal esophagus which might represent hiatal hernia. Normal adrenal glands. CTA of chest 2/22/20 reported enlarged mediastinal LNs around the trachea measuring up to 15 mm in short axis, mass extending from left hilum into LLL, LLL bronchus is occluded. KIRK uncus is not well visualized; however air is noted in dital KIRK bronchi. Severe narrowing of LLL pulmoanry artery as it passes through the previously described hypodensity in the left lung base. 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 This has progresed in the interval. Underlying emphysema present. Mild nodularity along the right major fissure. There is complete collapse of the LLL. Hypodense mass extending from the left hilum inferiorly with narrowing of the bronchovascular structures suspicious for malignancy.   
 
I had talked to Dr. Hugo Garrido last evening about her as well to help optimize a strategy for making a diagnosis. I hope the above is helpful and I would be quite pleased to assist in expediting her workup. I am sorry she ended up in the ER.

## 2020-02-26 NOTE — CONSULTS
[facility] Consult Service Note Pulmonary, Critical Care, and Sleep Medicine Name: Odilia Barba MRN: [id number] : 1945 Hospital: [facility] Date: 2020  Admission date: 2020 Hospital Day: 1 Subjective/Interval History:  
Seen earlier today on rounds. Pt is unstable and acutely ill. Medical records and data reviewed. Hospital Problems  Date Reviewed: 2020 Codes Class Noted POA  
 SOB (shortness of breath) ICD-10-CM: R06.02 
ICD-9-CM: 786.05  2020 Unknown Acute respiratory failure with hypoxia [facility]) ICD-10-CM: J96.01 
ICD-9-CM: 518.81  2020 Unknown IMPRESSION:  
1. Acute chronic respiratory failure with Hypoxia- recently prescribed home O2 2 LPm, now on 60% BIPAP 2. Locally advanced lung cancer likely with large left hilar , LLL lung mass, left mainstem occlusion, mediastinal adenopathy, small effusion; referral were recently made to Oncology- appreciate Dr. Hoa Pearson courtesy note; no tissue dx yet; breathing status tenuous; unfortunately fast progression. [facility] has closed down in just four days. Not sure how long LLL has been occluded. Not sure if any intervention can recover any lung function 3. Weight loss over 25 pounds in past year with anorexia 4.  smoker 5. COPD in home inhalers 6. ETOH use 7. DM type 2 
8. Body mass index is 22.89 kg/m². 9. Multiorgan dysfunction as outlined above: Pt has one or more acute or chronic illnesses with severe exacerbation with progression or side effects of treatment that poses a threat to life or bodily function 10. Additional workup outlined below 11. Pt is requiring Drug therapy requiring intensive monitoring for toxicity 12. Pt is unstable, unpredictable needing inpatient monitoring; is acutely ill and at high risk of sudden decline and decompensation with severe consequenses and continued end organ dysfunction and failure 13. Prognosis guarded RECOMMENDATIONS/PLAN:  
1. Agree with need for inpt management 2. BIPAP for non invasive ventilatory life support 3. Need to see if pt can breath comfortably for bronchoscopy 4. Will discuss case with my colleagues at [facility] on whether interventional procedure feasible 5. Risks of diagnostic procedures without therapeutic tools is high 6. NPO after midnight 7. Hold metformin 8. Supplemental O2 to keep sats > 93% 9. Aspiration precautions 10. Labs to follow electrolytes, renal function and and blood counts 11. Bronchial hygiene with respiratory therapy techniques, bronchodilators 12. DVT, SUP prophylaxis 13. Smoking cessation counseling done 14. Pt needs IV fluids with additives and Drug therapy requiring intensive monitoring for toxicity 15. Prescription drug management with home med reconciliation reviewed [x] High complexity decision making was performed [x] See my orders for details Subjective/Initial History:  
 
I was asked by Annika Knapp MD to see Brandi Mcmillan  a 69 y.o.  female in consultation for a chief complaint of left lung mass and worsening respiratory rfailure Excerpts from admission 2/26/2020 or consult notes as follows:  
 
\"  Brandi Mcmillan is a 69 y.o. female, pmhx diabetes, hypertension, high cholesterol, COPD, who presents via ambulance to the ED c/o of breath. Patient notes she has been short of breath for the past couple days. This morning she was in severe distress despite using albuterol treatments so she called EMS. On their arrival they noted patient to be hypoxic in the mid 80s started her on 2 L of oxygen but she had minimal improvement in her saturation and they increased the oxygen to 5 L nasal cannula until her saturations improved to 93%.   Patient denies any chest pain, back pain, abdominal pain, nausea, vomiting, diarrhea as well as any chest pain. Of note patient was just seen in the emergency room on Saturday for shortness of breath and weight loss. At that time her saturations were normal but CT was notable for a left hilar and lower lobe malignancy causing obstruction as well as possible metastatic disease. She was seen by her doctors office on the 24th and prescribed home oxygen (which was not yet delivered), albuterol nebulizer with compressor and was referred to oncology and pulmonology. \"  
 
Shortness of breath onset several days ago. Was seen Sunday with Ct chest which I reviewed from Feb 22, 2020. CXRs from [facility] 2019 also reviewed. Reports that she is more SOB than she has been. CXR tonight shows opacification of left hemithorax. Ct scan now shows obstruction of left mainstem bronchus. Has mediastinal adenopathy and shift to the left with left volume loss. No pain. No fever. Has had chills, sweats. No hemoptysis. No purulence. D/c with Er attending, hospitalist and pt with family at bedside. Pt smoker, drinker. Anorexia and weight loss. Thirsty. Flu negative. No teeh. No dysphagiua. BIPAP placed for respiratory distress. 14/ 7 and 60% O2. No Known Allergies MAR reviewed and pertinent medications noted or modified as needed No current facility-administered medications for this encounter. Current Outpatient Medications Medication Sig  
 albuterol (PROVENTIL VENTOLIN) 2.5 mg /3 mL (0.083 %) nebu 3 mL by Nebulization route every four (4) hours as needed for Wheezing.  Nebulizer & Compressor machine Use every 4 hours as needed for wheezing  umeclidinium-vilanterol (ANORO ELLIPTA) 62.5-25 mcg/actuation inhaler Take 1 Puff by inhalation daily.  megestrol (MEGACE) 40 mg tablet Take 1 Tab by mouth two (2) times a day. Indications: cachexia  budesonide-formoterol (SYMBICORT) 160-4.5 mcg/actuation HFAA Take 2 Puffs by inhalation two (2) times a day.  Indications: Bronchospasm Prevention with COPD  
  albuterol (VENTOLIN HFA) 90 mcg/actuation inhaler INHALE TWO PUFFS BY MOUTH EVERY 6 HOURS AS NEEDED FOR  WHEEZING  FOR  ACUTE  ASTHMA  ATTACK  metoprolol succinate (TOPROL-XL) 50 mg XL tablet TAKE 1 TABLET BY MOUTH ONCE DAILY  ergocalciferol, vitamin D2, (VITAMIN D2 PO) Take  by mouth.  metFORMIN ER (GLUCOPHAGE XR) 500 mg tablet TAKE ONE TABLET BY MOUTH ONCE DAILY WITH  DINNER  FOR  TYPE  2  DIABETES  MELLITUS  
 losartan (COZAAR) 100 mg tablet TAKE 1 TABLET BY MOUTH ONCE DAILY FOR  HYPERTENSION  
 busPIRone (BUSPAR) 5 mg tablet Take 1 Tab by mouth two (2) times a day.  simvastatin (ZOCOR) 20 mg tablet TAKE 1 TABLET BY MOUTH NIGHTLY  chlorthalidone (HYGROTEN) 25 mg tablet Take 1 Tab by mouth daily. Indications: HYPERTENSION Patient PCP: Lisandro Vicente NP 
PMH:  has a past medical history of Chronic obstructive pulmonary disease ([facility] [address] Diabetes ([facility] [address] Environmental allergies,  5, Hyperlipidemia, Hypertension, Hypokalemia, and Menopause. She also has no past medical history of Endocrine disorder. PSH:   has a past surgical history that includes hx cataract removal (2013.). FHX: family history includes Cancer in her father and mother; Diabetes in her son. SHX:  reports that she has been smoking. She has never used smokeless tobacco. She reports current alcohol use. She reports that she does not use drugs. ROS:A comprehensive review of systems was negative except for that written in the HPI. Objective:  
 
Vital Signs: Telemetry:    normal sinus rhythm Intake/Output:  
Visit Vitals /84 Pulse 87 Temp 96.8 °F (36 °C) Resp 23 Ht 5' 9\" (1.753 m) Wt 70.3 kg (154 lb 15.7 oz) SpO2 95% BMI 22.89 kg/m² Temp (24hrs), Av.7 °F (37.6 °C), Min:96.8 °F (36 °C), Max:103.2 °F (39.6 °C) O2 Device: Nasal cannula O2 Flow Rate (L/min): 4 l/min Wt Readings from Last 4 Encounters:  
20 70.3 kg (154 lb 15.7 oz) 20 70.3 kg (155 lb) 02/24/20 70.7 kg (155 lb 12.8 oz) 02/22/20 72.8 kg (160 lb 7.9 oz) No intake or output data in the 24 hours ending 02/26/20 1839 Last shift:      No intake/output data recorded. Last 3 shifts: No intake/output data recorded. Physical Exam:  
General:  female; alert, oriented times 3, afebrile, cooperative, appears dehydrated and moderately ill;  BIPAP/NIV;  
HEENT: NCAT, no dentition, lips and mucosa dry Eyes: anicteric; conjunctiva clear Neck: no nodes, no cuff leak, no accessory MM use. Chest: no deformity,  
Cardiac: regular rate, rhythm; no murmur Lungs: absent breath sounds on left; no wheezes on left Abd: soft, NT, hypoactive BS Ext: no edema; no joint swelling; No clubbing : NO hdez, Neuro: fluent alert; 
Psych- no agitation, oriented to person;  
Skin: warm, dry, no cyanosis; no clubbing Pulses: 1-2+ Bilateral pedal, radial 
Capillary: brisk; pale Labs: 
 
Recent Labs  
  02/26/20 
1139 WBC 25.7* HGB 11.2*  
* Recent Labs  
  02/26/20 
1139 * K 3.3*  
 CO2 20* * BUN 10  
CREA 0.70 CA 9.2 LAC 1.3 ALB 2.6* SGOT 17 ALT 17 No results for input(s): PH, PCO2, PO2, HCO3, FIO2 in the last 72 hours. No results for input(s): CPK, CKNDX, TROIQ in the last 72 hours. No lab exists for component: CPKMB No results found for: BNPP, BNP Lab Results Component Value Date/Time Culture result: NO GROWTH 4 DAYS 02/22/2020 02:12 PM  
 
Lab Results Component Value Date/Time TSH 4.110 01/15/2018 11:09 AM  
 
 
Imaging: CXR Results  (Last 48 hours) 02/26/20 1124  XR CHEST PORT Final result Impression:  IMPRESSION: Opacification of the left hemithorax with evidence of associated  
volume loss as described above. Narrative:  Portable chest single view dated 2/26/2020 Comparison chest dated 2/20/2020 History is meets SIRS criteria A single frontal view of the chest was obtained. There is complete opacification  
of the left hemithorax. There is evidence of volume loss within left hemithorax  
as evidence by shift of the heart and mediastinum towards the left. This is  
suggestive of atelectasis. A left pleural effusion may be present. Superimposed  
parenchymal disease may be present as well. The right lung is normally aerated. [place] [facility] [place] XR CHEST PORT Narrative Portable chest single view dated 2/26/2020 Comparison chest dated 2/20/2020 History is meets SIRS criteria A single frontal view of the chest was obtained. There is complete opacification 
of the left hemithorax. There is evidence of volume loss within left hemithorax 
as evidence by shift of the heart and mediastinum towards the left. This is 
suggestive of atelectasis. A left pleural effusion may be present. Superimposed 
parenchymal disease may be present as well. The right lung is normally aerated. Impression IMPRESSION: Opacification of the left hemithorax with evidence of associated 
volume loss as described above. [place] [facility] [place] XR CHEST PORT Narrative EXAM: XR CHEST PORT INDICATION: Cough, weakness, and fatigue for one month. COMPARISON: Chest views on 12/30/2019 TECHNIQUE: Upright portable chest AP view FINDINGS: Cardiac monitoring wires overlie the thorax. The cardiomediastinal and 
hilar contours are within normal limits. The pulmonary vasculature is within 
normal limits. Decreased opacities at the left lung base. Right lung is clear. No pneumothorax. Bones are unchanged. Impression IMPRESSION: 
 
Improved left lung aeration. No evidence of acute process on portable chest. 
Consider PA and lateral chest views when the patient can better tolerate. Results from Hospital Encounter encounter on 12/30/19 XR CHEST PA LAT Narrative Clinical indication: Shortness of breath, COPD, smoker. Frontal and lateral views of the chest obtained and compared to 2014. There is a lesion in the left infrahilar region with some associated pleural 
thickening and linear atelectasis at the left lung base. Malignancy to be 
excluded. CT of the chest suggested for further evaluation. The heart size is 
normal. The right lung is clear. Impression  impression: New lesion left lower lobe. CT of the chest with contrast suggested 
for further evaluation. [place] [facility] [place]20 CT CHEST WO CONT Narrative EXAM: CT CHEST WO CONT INDICATION: left mass with effusion r/o ptx COMPARISON: The CT examination of the chest of 2/22/2020 is available for 
correlation. CONTRAST: None. TECHNIQUE:  5 mm axial images were obtained through the chest. Coronal and 
sagittal reconstructions were generated. CT dose reduction was achieved through 
use of a standardized protocol tailored for this examination and automatic 
exposure control for dose modulation. The absence of intravenous contrast reduces the sensitivity for evaluation of 
the mediastinum and upper abdominal organs. FINDINGS: 
There has been development of a large amount of soft tissue density within the 
left mid thorax. . A few patchy areas of aeration are noted in the posterolateral 
aspect of the left midlung as well as the anterolateral aspect of the left upper 
lobe. In view of the absence of intravenous administered contrast material. The 
previously noted pressure effects upon the vasculature of the left lower lobe 
are not evident on the current examination. The previously described obstruction 
of the left mainstem bronchus secondary to the left hilar mass lesion is again 
noted (see axial image 31 and coronal image 30). In view of the apparent volume 
loss within the left upper thorax. The aforementioned soft tissue density may represent the presence of collapsed lung. No pneumothorax is detected. A left 
pleural effusion is likely. There continues to be evidence of mediastinal 
lymphadenopathy. The right lung is normally aerated. Impression IMPRESSION: 
1. Presence of abnormal soft tissue density associated with the majority of the 
left hemithorax with evidence of associated volume loss. This is primarily 
secondary to atelectasis/collapse of the left lung secondary to the mass lesion 
which obstructs the left mainstem bronchus. A left pleural effusion is likely. No pneumothorax detected. 2. Evidence of mediastinal adenopathy. 3. Normal appearance of the right lung. This care involved high complexity medical decision making: I personally: · Reviewed the flowsheet and previous days notes · Reviewed and summarized records or history from previous days note or discussions with staff, family · High Risk Drug therapy requiring intensive monitoring for toxicity: eg steroids, pressors, antibiotics · Reviewed and/or ordered Clinical lab tests · Reviewed images and/or ordered Radiology tests · Reviewed the patients ECG / Telemetry · Reviewed and/or adjusted NiPPV settings ·  Called and arranged for Radiologic procedures or interventions ·  discussed my assessment/management with : Er attending, Nursing, Hospitalist and Family for coordination of care Hiwot Mathews MD

## 2020-02-27 PROBLEM — E87.6 HYPOKALEMIA: Status: ACTIVE | Noted: 2020-01-01

## 2020-02-27 PROBLEM — J44.9 COPD (CHRONIC OBSTRUCTIVE PULMONARY DISEASE) (HCC): Status: ACTIVE | Noted: 2020-01-01

## 2020-02-27 PROBLEM — J98.11 COLLAPSE OF LEFT LUNG: Status: ACTIVE | Noted: 2020-01-01

## 2020-02-27 PROBLEM — N39.0 UTI (URINARY TRACT INFECTION): Status: ACTIVE | Noted: 2020-01-01

## 2020-02-27 PROBLEM — R91.8 LUNG MASS: Status: ACTIVE | Noted: 2020-01-01

## 2020-02-27 NOTE — CONSULTS
[facility] Consult Service Note Pulmonary, Critical Care, and Sleep Medicine Name: Mulu Lr MRN: [id number] : 1945 Hospital: [facility] Date: 2020  Admission date: 2020 Hospital Day: 2 Subjective/Interval History:  
Seen earlier today on rounds. Pt is acutely ill. Medical records and data reviewed.  breathing much better. No distress. Daughter at bedside. On nasal O2. No longer needing NIV. No hemoptysis. Hungry. They are willing to be transferred to [facility] for interventional bronchoscopy evaluation. LLL is not really salvageable but they are will to see if [facility] can be reopened but told them no guarantee IMPRESSION:  
1. Acute chronic respiratory failure with Hypoxia- recently prescribed home O2 2 LPm, now on 60% BIPAP 2. Locally advanced lung cancer likely with large left hilar , LLL lung mass, left mainstem occlusion, mediastinal adenopathy, small effusion; referral were recently made to Oncology- appreciate Dr. Al Brittle courtesy note; no tissue dx yet; breathing status tenuous; unfortunately fast progression. [facility] has closed down in just four days. Not sure how long LLL has been occluded. Not sure if any intervention can recover any lung function 3. Weight loss over 25 pounds in past year with anorexia, malnutrition 4.  smoker 5. COPD in home inhalers 6. ETOH use 7. DM type 2 Body mass index is 22.89 kg/m². 8. Multiorgan dysfunction as outlined above: Pt has one or more acute or chronic illnesses with severe exacerbation with progression or side effects of treatment that poses a threat to life or bodily function 9. Additional workup outlined below 10. Pt is requiring Drug therapy requiring intensive monitoring for toxicity 11.  Pt is unstable, unpredictable needing inpatient monitoring; is acutely ill and at high risk of sudden decline and decompensation with severe consequenses and continued end organ dysfunction and failure 12. Prognosis guarded RECOMMENDATIONS/PLAN:  
1. Transfer to [facility] medicine and they should consult interventional pulmonology consult service 2. Discussed with attending 3. Pt can have a diet today 4. Pt and family aware that bed availability is not controllable 5. Hold metformin 6. Supplemental O2 to keep sats > 93% 7. Aspiration precautions 8. Labs to follow electrolytes, renal function and and blood counts 9. Bronchial hygiene with respiratory therapy techniques, bronchodilators 10. DVT prophylaxis 11. Smoking cessation counseling done 12. Pt needs IV fluids with additives and Drug therapy requiring intensive monitoring for toxicity 13. Prescription drug management with home med reconciliation reviewed [x] High complexity decision making was performed [x] See my orders for details Subjective/Initial History:  
 
I was asked by Romeo Steele MD to see Abdoulaye Kirk  a 69 y.o.  female in consultation for a chief complaint of left lung mass and worsening respiratory rfailure Excerpts from admission 2/26/2020 or consult notes as follows:  
 
\"  Abdoulaye Kirk is a 69 y.o. female, pmhx diabetes, hypertension, high cholesterol, COPD, who presents via ambulance to the ED c/o of breath. Patient notes she has been short of breath for the past couple days. This morning she was in severe distress despite using albuterol treatments so she called EMS. On their arrival they noted patient to be hypoxic in the mid 80s started her on 2 L of oxygen but she had minimal improvement in her saturation and they increased the oxygen to 5 L nasal cannula until her saturations improved to 93%. Patient denies any chest pain, back pain, abdominal pain, nausea, vomiting, diarrhea as well as any chest pain. Of note patient was just seen in the emergency room on Saturday for shortness of breath and weight loss. At that time her saturations were normal but CT was notable for a left hilar and lower lobe malignancy causing obstruction as well as possible metastatic disease. She was seen by her doctors office on the 24th and prescribed home oxygen (which was not yet delivered), albuterol nebulizer with compressor and was referred to oncology and pulmonology. \"  
 
Shortness of breath onset several days ago. Was seen Sunday with Ct chest which I reviewed from Feb 22, 2020. CXRs from [facility] 2019 also reviewed. Reports that she is more SOB than she has been. CXR tonight shows opacification of left hemithorax. Ct scan now shows obstruction of left mainstem bronchus. Has mediastinal adenopathy and shift to the left with left volume loss. No pain. No fever. Has had chills, sweats. No hemoptysis. No purulence. D/c with Er attending, hospitalist and pt with family at bedside. Pt smoker, drinker. Anorexia and weight loss. Thirsty. Flu negative. No teeh. No dysphagiua. BIPAP placed for respiratory distress. 14/ 7 and 60% O2. No Known Allergies MAR reviewed and pertinent medications noted or modified as needed Current Facility-Administered Medications Medication  albuterol-ipratropium (DUO-NEB) 2.5 MG-0.5 MG/3 ML  
 arformoteroL (BROVANA) neb solution 15 mcg  insulin lispro (HUMALOG) injection  glucose chewable tablet 16 g  
 dextrose (D50W) injection syrg 12.5-25 g  
 glucagon (GLUCAGEN) injection 1 mg  losartan (COZAAR) tablet 100 mg  
 megestrol (MEGACE) tablet 40 mg  
 metoprolol succinate (TOPROL-XL) XL tablet 50 mg  
 atorvastatin (LIPITOR) tablet 20 mg  
 sodium chloride (NS) flush 5-40 mL  sodium chloride (NS) flush 5-40 mL  dextrose (D50W) injection syrg 12.5-25 g  
 ondansetron (ZOFRAN) injection 4 mg  heparin (porcine) injection 5,000 Units  piperacillin-tazobactam (ZOSYN) 3.375 g in 0.9% sodium chloride (MBP/ADV) 100 mL Patient PCP: Eve Mart NP 
PMH:  has a past medical history of Chronic obstructive pulmonary disease ([facility] [address] Diabetes ([facility] [address] Environmental allergies,  5, Hyperlipidemia, Hypertension, Hypokalemia, and Menopause. She also has no past medical history of Endocrine disorder. PSH:   has a past surgical history that includes hx cataract removal (2013.). FHX: family history includes Cancer in her father and mother; Diabetes in her son. SHX:  reports that she has been smoking. She has never used smokeless tobacco. She reports current alcohol use. She reports that she does not use drugs. ROS:A comprehensive review of systems was negative except for that written in the HPI. Objective:  
 
Vital Signs: Telemetry:    normal sinus rhythm Intake/Output:  
Visit Vitals BP (!) 146/92 Pulse 92 Temp 97.4 °F (36.3 °C) Resp 16 Ht 5' 9\" (1.753 m) Wt 70.3 kg (154 lb 15.7 oz) SpO2 94% BMI 22.89 kg/m² Temp (24hrs), Av.4 °F (36.9 °C), Min:96.8 °F (36 °C), Max:103.2 °F (39.6 °C) O2 Device: Nasal cannula O2 Flow Rate (L/min): 3 l/min Wt Readings from Last 4 Encounters:  
20 70.3 kg (154 lb 15.7 oz) 20 70.3 kg (155 lb)  
20 70.7 kg (155 lb 12.8 oz) 20 72.8 kg (160 lb 7.9 oz) Intake/Output Summary (Last 24 hours) at 2020 0027 Last data filed at 2020 0937 Gross per 24 hour Intake 240 ml Output 650 ml Net -410 ml Last shift:      No intake/output data recorded. Last 3 shifts: 1901 -  0700 In: 240 [P.O.:240] Out: 650 [Urine:650] Physical Exam:  
General:  female; alert, oriented times 3, afebrile, cooperative, appears dehydrated and moderately ill;  BIPAP/NIV;  
HEENT: NCAT, no dentition, lips and mucosa dry Eyes: anicteric; conjunctiva clear Neck: no nodes, no cuff leak, no accessory MM use. Chest: no deformity,  
Cardiac: regular rate, rhythm; no murmur Lungs: absent breath sounds on left; no wheezes on left Abd: soft, NT, hypoactive BS Ext: no edema; no joint swelling; No clubbing : NO hdez, Neuro: fluent alert; 
Psych- no agitation, oriented to person;  
Skin: warm, dry, no cyanosis; no clubbing Pulses: 1-2+ Bilateral pedal, radial 
Capillary: brisk; pale Labs: 
 
Recent Labs  
  02/27/20 
0500 02/26/20 
1139 WBC 22.8* 25.7* HGB 9.5* 11.2*  
* 566* Recent Labs  
  02/27/20 
0500 02/26/20 
1139  134* K 3.2* 3.3*  
* 100 CO2 20* 20* * 161* BUN 12 10 CREA 0.62 0.70 CA 8.5 9.2 LAC 1.1 1.3 ALB  --  2.6* SGOT  --  17 ALT  --  17 No results for input(s): PH, PCO2, PO2, HCO3, FIO2 in the last 72 hours. No results for input(s): CPK, CKNDX, TROIQ in the last 72 hours. No lab exists for component: CPKMB No results found for: BNPP, BNP Lab Results Component Value Date/Time Culture result: NO GROWTH AFTER 18 HOURS 02/26/2020 11:45 AM  
 Culture result: NO GROWTH AFTER 18 HOURS 02/26/2020 11:39 AM  
 Culture result: NO GROWTH 5 DAYS 02/22/2020 02:12 PM  
 
Lab Results Component Value Date/Time TSH 4.110 01/15/2018 11:09 AM  
 
 
Imaging: CXR Results  (Last 48 hours) 02/26/20 1124  XR CHEST PORT Final result Impression:  IMPRESSION: Opacification of the left hemithorax with evidence of associated  
volume loss as described above. Narrative:  Portable chest single view dated 2/26/2020 Comparison chest dated 2/20/2020 History is meets SIRS criteria A single frontal view of the chest was obtained. There is complete opacification  
of the left hemithorax. There is evidence of volume loss within left hemithorax  
as evidence by shift of the heart and mediastinum towards the left. This is  
suggestive of atelectasis. A left pleural effusion may be present. Superimposed parenchymal disease may be present as well. The right lung is normally aerated. Results from [facility] Encounter encounter on 02/26/20 XR CHEST PORT Narrative Portable chest single view dated 2/26/2020 Comparison chest dated 2/20/2020 History is meets SIRS criteria A single frontal view of the chest was obtained. There is complete opacification 
of the left hemithorax. There is evidence of volume loss within left hemithorax 
as evidence by shift of the heart and mediastinum towards the left. This is 
suggestive of atelectasis. A left pleural effusion may be present. Superimposed 
parenchymal disease may be present as well. The right lung is normally aerated. Impression IMPRESSION: Opacification of the left hemithorax with evidence of associated 
volume loss as described above. Results from [facility] Encounter encounter on 02/22/20 XR CHEST PORT Narrative EXAM: XR CHEST PORT INDICATION: Cough, weakness, and fatigue for one month. COMPARISON: Chest views on 12/30/2019 TECHNIQUE: Upright portable chest AP view FINDINGS: Cardiac monitoring wires overlie the thorax. The cardiomediastinal and 
hilar contours are within normal limits. The pulmonary vasculature is within 
normal limits. Decreased opacities at the left lung base. Right lung is clear. No pneumothorax. Bones are unchanged. Impression IMPRESSION: 
 
Improved left lung aeration. No evidence of acute process on portable chest. 
Consider PA and lateral chest views when the patient can better tolerate. Results from Hospital Encounter encounter on 12/30/19 XR CHEST PA LAT Narrative Clinical indication: Shortness of breath, COPD, smoker. Frontal and lateral views of the chest obtained and compared to 2014. There is a lesion in the left infrahilar region with some associated pleural 
thickening and linear atelectasis at the left lung base. Malignancy to be excluded. CT of the chest suggested for further evaluation. The heart size is 
normal. The right lung is clear. Impression  impression: New lesion left lower lobe. CT of the chest with contrast suggested 
for further evaluation. Results from [facility] Encounter encounter on 02/26/20 CT CHEST WO CONT Narrative EXAM: CT CHEST WO CONT INDICATION: left mass with effusion r/o ptx COMPARISON: The CT examination of the chest of 2/22/2020 is available for 
correlation. CONTRAST: None. TECHNIQUE:  5 mm axial images were obtained through the chest. Coronal and 
sagittal reconstructions were generated. CT dose reduction was achieved through 
use of a standardized protocol tailored for this examination and automatic 
exposure control for dose modulation. The absence of intravenous contrast reduces the sensitivity for evaluation of 
the mediastinum and upper abdominal organs. FINDINGS: 
There has been development of a large amount of soft tissue density within the 
left mid thorax. . A few patchy areas of aeration are noted in the posterolateral 
aspect of the left midlung as well as the anterolateral aspect of the left upper 
lobe. In view of the absence of intravenous administered contrast material. The 
previously noted pressure effects upon the vasculature of the left lower lobe 
are not evident on the current examination. The previously described obstruction 
of the left mainstem bronchus secondary to the left hilar mass lesion is again 
noted (see axial image 31 and coronal image 30). In view of the apparent volume 
loss within the left upper thorax. The aforementioned soft tissue density may 
represent the presence of collapsed lung. No pneumothorax is detected. A left 
pleural effusion is likely. There continues to be evidence of mediastinal 
lymphadenopathy. The right lung is normally aerated.  
  
 Impression IMPRESSION: 
 1. Presence of abnormal soft tissue density associated with the majority of the 
left hemithorax with evidence of associated volume loss. This is primarily 
secondary to atelectasis/collapse of the left lung secondary to the mass lesion 
which obstructs the left mainstem bronchus. A left pleural effusion is likely. No pneumothorax detected. 2. Evidence of mediastinal adenopathy. 3. Normal appearance of the right lung. This care involved high complexity medical decision making: I personally: · Reviewed the flowsheet and previous days notes · Reviewed and summarized records or history from previous days note or discussions with staff, family · High Risk Drug therapy requiring intensive monitoring for toxicity: eg steroids, pressors, antibiotics · Reviewed and/or ordered Clinical lab tests · Reviewed images and/or ordered Radiology tests · Reviewed the patients ECG / Telemetry · Reviewed and/or adjusted NiPPV settings ·  Called and arranged for Radiologic procedures or interventions ·  discussed my assessment/management with : Er attending, Nursing, Hospitalist and Family for coordination of care Deandra Carballo MD

## 2020-02-27 NOTE — DISCHARGE INSTRUCTIONS
Patient Education        Learning About Lung Nodules  What is a lung nodule? A lung nodule is a growth in the lung. A single nodule surrounded by lung tissue is called a solitary pulmonary nodule. A lung nodule might not cause any symptoms. Your doctor may have found one or more nodules on your lung when you were having a chest X-ray or CT scan. Or it may have been found during a lung cancer screening. A lung nodule may be caused by an old infection or cancer. It might also be a noncancerous growth. Lung nodules can cause a screening to give an abnormal result. Most nodules do not cause any harm. But without further tests, your doctor can't tell whether an abnormal finding is cancer, a harmless nodule, or something else. What can you expect when you have a lung nodule? Your doctor will look at several risk factors to see how likely it is that the nodule is cancer. He or she will look at:  · Whether you smoke or have ever smoked. · Your age and your family's medical history. · Whether you have ever had lung cancer. · The size and shape of the nodule. · Whether the nodule has changed in size. Your doctor may look at past chest X-rays or CT scans, if available, and compare them. Or you may have a series of CT scans to see if the nodule grows over time. What happens next depends on the risk of the nodule being cancer. · If you have no risk factors and the nodule is small, your doctor may advise doing nothing. · If the risk is small, your doctor may schedule follow-up appointments and tests. You may have more CT scans later to see if the nodule is growing. If the nodule hasn't changed in 3 to 6 months, you may have CT scans every year. If it hasn't changed in 2 years, you may not need any more tests. · If there's a higher risk of cancer, your doctor may:  ? Do a PET scan, which may help tell if the nodule is cancerous or not. ? Take a sample of tissue from the nodule for testing.  This is called a biopsy. ? Remove the nodule with surgery. Follow-up care is a key part of your treatment and safety. Be sure to make and go to all appointments, and call your doctor if you are having problems. It's also a good idea to know your test results and keep a list of the medicines you take. Where can you learn more? Go to http://jarocho-mike.info/. Enter Q973 in the search box to learn more about \"Learning About Lung Nodules. \"  Current as of: 2018  Content Version: 12.2  © 0077-4475 [employer]. Care instructions adapted under license by [employer] (which disclaims liability or warranty for this information). If you have questions about a medical condition or this instruction, always ask your healthcare professional. [place] [address] any warranty or liability for your use of this information. HOSPITALIST DISCHARGE INSTRUCTIONS    NAME: Thalia Beyer   :  1945   MRN:  [id number]     Date/Time:  2020 6:23 PM    ADMIT DATE: 2020   DISCHARGE DATE: 2020     Attending Physician: Helen Mills NP    DISCHARGE DIAGNOSIS:  [address] Problems    Diagnosis Date Noted    Lung mass 2020    Collapse of left lung 2020    UTI (urinary tract infection) 2020    Hypokalemia 2020    COPD (chronic obstructive pulmonary disease) ([facility] [address] 2020    SOB (shortness of breath) 2020    Acute respiratory failure with hypoxia ([facility] [address] 2020    Smoker 2016    Diabetes ([facility] [address]     Hyperlipidemia     Hypertension            Medications: Per above medication reconciliation.     Pain Management: per above medications    Recommended diet: Diabetic Diet    Recommended activity: Activity as tolerated    Wound care: None    Indwelling devices:  None    Supplemental Oxygen: Currently on O2 3L NC    Required Lab work: Per [facility]    Glucose management:  Accucheck ACHS with sliding scale per SNF protocol    Code status: Full        Outside physician follow up: Follow-up Information     Follow up With Specialties Details Why Contact Info    Bear Gary NP Nurse Practitioner   [facility]  [address] [address]  [phone]      Avelina Jenkins MD Hematology and Oncology, Hematology, Oncology Schedule an appointment as soon as possible for a visit  [address]  [phone]                Information obtained by :  I understand that if any problems occur once I am at home I am to contact my physician. I understand and acknowledge receipt of the instructions indicated above.                                                                                                                                            Physician's or R.N.'s Signature                                                                  Date/Time                                                                                                                                              Patient or Repres

## 2020-02-27 NOTE — ACP (ADVANCE CARE PLANNING)
Advance Care Planning Note NAME: Joel Gore :  1945 MRN:  [id number] Date/Time:  2020 7:56 PM 
 
Active Diagnoses: 
Hospital Problems  Date Reviewed: 2020 Codes Class Noted POA  
 SOB (shortness of breath) ICD-10-CM: R06.02 
ICD-9-CM: 786.05  2020 Unknown Acute respiratory failure with hypoxia [facility]) ICD-10-CM: J96.01 
ICD-9-CM: 518.81  2020 Unknown These active diagnoses are of sufficient risk that focused discussion on advance care planning is indicated in order to allow the patient to thoughtfully consider personal goals of care, and if situations arise that prevent the ability to personally give input, to ensure appropriate representation of their personal desires for different levels and aggressiveness of care. Discussion:  
Code status addressed and wants to be a Full Code. Patient wants central line and vasopressors if needed. Patient would also want a feeding tube, if needed, for nutritional support. Patient  would like to assign Her daughter Maggi Callahan [phone]   as the surrogate decision maker. Persons present and participating in discussion: Devang Fernandez MD 
 
 
Time Spent:  
Total time spent face-to-face in education and discussion:  16 minutes.   
 
 
 
Yessica Eastman MD  
Hospitalist 
 Pt. HR @ 10:21pm 135, afebrile, /76  Retook apical HR of 109

## 2020-02-27 NOTE — PROGRESS NOTES
ADULT PROTOCOL: JET AEROSOL ASSESSMENT Patient  Edinson Forbes     69 y.o.   female     2/27/2020  12:09 AM 
 
Breath Sounds Pre Procedure: Right Breath Sounds: Expiratory wheezing Left Breath Sounds: Expiratory wheezing Breath Sounds Post Procedure: Right Breath Sounds: Expiratory wheezing Left Breath Sounds: Expiratory wheezing Breathing pattern: Pre procedure Breathing Pattern: Regular Post procedure Breathing Pattern: Regular Heart Rate: Pre procedure Pulse: 90 
         Post procedure Pulse: 94 Resp Rate: Pre procedure Respirations: 18 Post procedure Respirations: 20 
 
Oxygen: O2 Device: Nasal cannula   4LPM 
   Changed: NO SpO2: Pre procedure SpO2: 97 %   WITH oxygen Post procedure SpO2: 95 % WITH oxygen Nebulizer Therapy: Current medications Aerosolized Medications: DuoNeb, Ipratropium bromide Changed: NO 
 
Problem List:  
Patient Active Problem List  
Diagnosis Code  [facility] E11.9  Hypertension I10  
 Hyperlipidemia E78.5  Arthritis, degenerative M19.90  Smoker F17.200  Type 2 diabetes with nephropathy (HCC) E11.21  
 SOB (shortness of breath) R06.02  
 Acute respiratory failure with hypoxia (HCC) J96.01 Respiratory Therapist: Martha Dias

## 2020-02-27 NOTE — DISCHARGE SUMMARY
Hospitalist Discharge Summary Patient ID: Erin Renteria [id number] 
89 y.o. 
1945 
2/26/2020 PCP on record: David Munoz NP Admit date: 2/26/2020 Discharge date and time: 2/27/2020 DISCHARGE DIAGNOSIS: 
 
Active Hospital Problems Diagnosis Date Noted  Lung mass 02/27/2020  Collapse of left lung 02/27/2020  UTI (urinary tract infection) 02/27/2020  Hypokalemia 02/27/2020  COPD (chronic obstructive pulmonary disease) ([facility] 02/27/2020  
 SOB (shortness of breath) 02/26/2020  Acute respiratory failure with hypoxia ([facility] 02/26/2020  Smoker 04/07/2016  Diabetes ([facility]  Hyperlipidemia  Hypertension CONSULTATIONS: 
IP CONSULT TO PULMONOLOGY 
IP CONSULT TO PALLIATIVE CARE - PROVIDER Excerpted HPI from H&P of Scotty Whittaker MD: 
Leno Rodriguez is a 69 y.o.  female the past medical history of COPD, diabetes mellitus, hyperlipidemia, hypertension who [address] for worsening shortness of breath associated with hypoxia and fever. Patient patient presented to the ED on February 22 with cough and chills and weight loss had a CT of the chest done which showed extensive lung mass with collapse of the lower lobe lung, and was supposed to follow-up with oncology. She presented today because of worsening shortness of breath associated with fever chills and persistent cough . In the ED, patient was febrile tempe 103.2, tachypneic with respiratory rate around 46 requiring BiPAP. His labs reveal elevated white blood cell count at 20 5K with a left shift, hypokalemia and hyponatremia. Repeat CT scan of the chest showed: Total collapse of the left lung 1. Presence of abnormal soft tissue density associated with the majority of the 
left hemithorax with evidence of associated volume loss. This is primarily 
secondary to atelectasis/collapse of the left lung secondary to the mass lesion which obstructs the left mainstem bronchus. A left pleural effusion is likely. No pneumothorax detected. 2. Evidence of mediastinal adenopathy. 3. Normal appearance of the right lung. 
  
We were asked to admit for work up and evaluation of the above problems. \" 
  
______________________________________________________________________ DISCHARGE SUMMARY/HOSPITAL COURSE:  for full details see H&P, daily progress notes, labs, consult notes. Jada Finnegan y.o. female was admitted to [facility] on 2/26/2020 and treated for the following medical complaints:  
 
Acute on chronic hypoxic respiratory failure, POA d/t Locally advanced left lung mass, LLL lung mass, left mainstem occlusion, and small effusion Total collapse of the left lung · BiPAP PRN; currently on O2 3L NC  
· Continue IV Zosyn · Appreciate pulmonary input; recommend transfer to [address] [place] [address] · CXR:  Opacification of the left hemithorax with evidence of associated volume loss · CT chest:  1. Presence of abnormal soft tissue density associated with the majority of the left hemithorax with evidence of associated volume loss. This is primarily secondary to atelectasis/collapse of the left lung secondary to the mass lesion which obstructs the left mainstem bronchus. A left pleural effusion is likely. No pneumothorax detected. 2. Evidence of mediastinal adenopathy. 3. Normal appearance of the right lung. · WBC 22.8; improved from 25.7 on admission · Appreciate oncology input · Palliative consulted  
  
Urinary tract infection, POA · UA with 30 protein, 15 ketones, moderate blood, small leuks, 10-20 WBC, 4+ bacteria · UC pending · WBC 22.8 · Continue IV Zosyn · BC with no growth after 18 hours  
  
Hypokalemia · K 3.2; replace and monitor  
  
COPD, not in acute exacerbation Hx of tobacco abuse · Continue with nebs and home inhalers  
  
DM HTN 
HLD · BS AC&HS 
· SSI · Hold metformin · Continue statin · Continue Cozaar and Toprol  
  
 Severe protein calorie malnutrition · Continue home Megace · RD consulted Plan of care has been discussed with patient and family. [facility] has accepted patient for interventional pulmonologist to do procedure in the morning. Patient will be transferred this evening. Reviewed plan of care with patient. Patient verbalizes understanding. Patient without questions or concerns at this time. ______________________________________________________________________ Patient seen and examined by me on discharge day. Pertinent Findings: 
Gen:    Not in distress Chest: Lung sounds absent on left CVS:   Regular rhythm. No edema Abd:  Soft, not distended, not tender Neuro:  Alert, oriented 
_______________________________________________________________________ DISCHARGE MEDICATIONS:  
Current Discharge Medication List  
  
START taking these medications Details  
piperacillin-tazobactam 3.375 gram 3.375 g, ADDaptor 1 Device IVPB 3.375 g by IntraVENous route every eight (8) hours. Qty: 1 Dose, Refills: 0 CONTINUE these medications which have NOT CHANGED Details  
albuterol (PROVENTIL VENTOLIN) 2.5 mg /3 mL (0.083 %) nebu 3 mL by Nebulization route every four (4) hours as needed for Wheezing. Qty: 24 Each, Refills: 3 Nebulizer & Compressor machine Use every 4 hours as needed for wheezing 
Qty: 1 Each, Refills: 0 Associated Diagnoses: Lung mass; Chronic obstructive pulmonary disease, unspecified COPD type (HCC)  
  
umeclidinium-vilanterol (ANORO ELLIPTA) 62.5-25 mcg/actuation inhaler Take 1 Puff by inhalation daily. Qty: 1 Inhaler, Refills: 0  
  
megestrol (MEGACE) 40 mg tablet Take 1 Tab by mouth two (2) times a day. Indications: cachexia Qty: 180 Tab, Refills: 1 Comments: Lung mass/weigth loss  
  
budesonide-formoterol (SYMBICORT) 160-4.5 mcg/actuation HFAA Take 2 Puffs by inhalation two (2) times a day.  Indications: Bronchospasm Prevention with COPD 
 Qty: 1 Inhaler, Refills: 6 Associated Diagnoses: Chronic bronchitis, unspecified chronic bronchitis type (HCC)  
  
albuterol (VENTOLIN HFA) 90 mcg/actuation inhaler INHALE TWO PUFFS BY MOUTH EVERY 6 HOURS AS NEEDED FOR  WHEEZING  FOR  ACUTE  ASTHMA  ATTACK Qty: 18 Inhaler, Refills: 3 Comments: Please consider 90 day supplies to promote better adherence  
  
metoprolol succinate (TOPROL-XL) 50 mg XL tablet TAKE 1 TABLET BY MOUTH ONCE DAILY Qty: 90 Tab, Refills: 3  
  
ergocalciferol, vitamin D2, (VITAMIN D2 PO) Take  by mouth.  
  
metFORMIN ER (GLUCOPHAGE XR) 500 mg tablet TAKE ONE TABLET BY MOUTH ONCE DAILY WITH  DINNER  FOR  TYPE  2  DIABETES  MELLITUS Qty: 90 Tab, Refills: 3  
  
losartan (COZAAR) 100 mg tablet TAKE 1 TABLET BY MOUTH ONCE DAILY FOR  HYPERTENSION Qty: 90 Tab, Refills: 2  
  
busPIRone (BUSPAR) 5 mg tablet Take 1 Tab by mouth two (2) times a day. Qty: 60 Tab, Refills: 1 Associated Diagnoses: Anxiety  
  
simvastatin (ZOCOR) 20 mg tablet TAKE 1 TABLET BY MOUTH NIGHTLY Qty: 30 Tab, Refills: 11  
  
chlorthalidone (HYGROTEN) 25 mg tablet Take 1 Tab by mouth daily. Indications: HYPERTENSION Qty: 30 Tab, Refills: 6 Associated Diagnoses: Essential hypertension Patient Follow Up Instructions: Activity: Activity as tolerated Diet: Diabetic Diet Wound Care: None needed Follow-up with PCP in 1 week. Follow-up Information Follow up With Specialties Details Why Contact Info Sylvie Elliott NP Nurse Practitioner   [id number] [facility] [address]  
[phone] Daris Gilford, MD Hematology and Oncology, Hematology, Oncology Schedule an appointment as soon as possible for a visit  [address] [address] [phone] 
  
  
 
________________________________________________________________ Risk of deterioration: Moderate Condition at Discharge:  Stable __________________________________________________________________ Disposition [facility] to [facility]  
 
____________________________________________________________________ Code Status: Full Code 
___________________________________________________________________ Total time in minutes spent coordinating this discharge (includes going over instructions, follow-up, prescriptions, and preparing report for sign off to her PCP) :  31 minutes Signed: 
Shahab Saleem NP

## 2020-02-27 NOTE — ED NOTES
TRANSFER - OUT REPORT: 
 
Verbal report given to dillan(name) ana maría Adams  being [facility] for routine progression of care Report consisted of patients Situation, Background, Assessment and  
Recommendations(SBAR). Information from the following report(s) SBAR and ED Summary was reviewed with the receiving nurse. Lines:    
 
Opportunity for questions and clarification was provided. Patient transported with: 
 Monitor Registered Nurse

## 2020-02-27 NOTE — PROGRESS NOTES
Problem: Falls - Risk of 
Goal: *Absence of Falls Description Document Lois Getting Fall Risk and appropriate interventions in the flowsheet. Outcome: Progressing Towards Goal 
Note: Fall Risk Interventions: 
Mobility Interventions: Bed/chair exit alarm, OT consult for ADLs, Patient to call before getting OOB, PT Consult for mobility concerns, PT Consult for assist device competence, Strengthening exercises (ROM-active/passive), Utilize walker, cane, or other assistive device Medication Interventions: Bed/chair exit alarm, Patient to call before getting OOB, Teach patient to arise slowly Elimination Interventions: Bed/chair exit alarm, Call light in reach, Patient to call for help with toileting needs

## 2020-02-27 NOTE — PROGRESS NOTES
Reason for Admission: Shortness of breath, acute respiratory failure w/ hypoxia RUR Score: 12% Plan for utilizing home health:  Pt could potentially benefit from [facility] for PT. PCP: First and Last name: Jo Castillo NP Name of Practice: [facility] you a current patient: Yes Approximate date of last visit: 2/24/2020 Current Advanced Directive/Advance Care Plan: Pt is a full code and does not have an ACP on file Transition of Care Plan: CM met with pt to discuss d/c planning. Pt son and daughter at bedside at the time of assessment. CM verified pt demographic, insurance, and PCP information. Pt resides alone in a 1 level home with no stairs to entry. Pt grandson occasionally stays with her and she has a total of 5 children to assist with care when needed. Pt is reports independence with ADLs/IADLs but has not driven in the last month. Pt has not had any recent falls but her daughter states that she can be unsteady on her feet. Pt is active with her PCP and uses the [facility] in [place] for medication needs. Plan [facility] for [facility]. [facility]. CM will continue to follow patient for discharge planning needs and arrange for services as deemed necessary. Care Management Interventions PCP Verified by CM: Yes Mode of Transport at Discharge: Other (see comment) Transition of Care Consult (CM Consult): Discharge Planning Current Support Network: Lives Alone, Family Lives Chavies Confirm Follow Up Transport: Other (see comment) The Plan for Transition of Care is Related to the Following Treatment Goals : Transfer to [address] [place] [address] The Patient and/or Patient Representative was Provided with a Choice of Provider and Agrees with the Discharge Plan?: Yes Ange Zapata, Care Manager [phone]

## 2020-02-27 NOTE — PROGRESS NOTES
Courtesy note Patient had appt to see me 2/26/20 at 3 PM for evaluation of her new lung mass but ended up in the ER due to her progressive shortness of breath. The following is a synopsis of the information we had collected about her probable lung cancer. She had presented to the ER on 2/22/20 with complaints of generalized progressive weakness, cough and chills without fever for a month. She has a history of COPD and was noted to have shortness of breath, fatigue, and weight loss. She had abeen started on megace with some improvment and was granzing on small amounts of food throughout the day. Caprice Bradley on 12/30/19 reported new lesion LLL. CT of chest with contrast suggested for further evaluation. CT of chest with contrast on 1/8/20 reported abnormal soft tissue lesion in posterior aspect of the left lung base 4.9 x 4.7 cm, suggestive of mass in midst of chronic LLL consolidation), mediastinal adenopathy (2 prominent left sided mediastinal LNs 2.1 x 1.6 cm and 1.8 cm; precarinal node 1.6 x 1.2 cm),  focal soft tissue density associated with distal esophagus which might represent hiatal hernia. Normal adrenal glands. CTA of chest 2/22/20 reported enlarged mediastinal LNs around the trachea measuring up to 15 mm in short axis, mass extending from left hilum into LLL, LLL bronchus is occluded. KIRK uncus is not well visualized; however air is noted in dital KIRK bronchi. Severe narrowing of LLL pulmoanry artery as it passes through the previously described hypodensity in the left lung base. 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 This has progresed in the interval. Underlying emphysema present. Mild nodularity along the right major fissure. There is complete collapse of the LLL. Hypodense mass extending from the left hilum inferiorly with narrowing of the bronchovascular structures suspicious for malignancy.   
 
I met with patient and family today and reviewed her course and reinforced earlier conversations held with Dr. Desi Tan. Will keep fingers crossed.

## 2020-02-27 NOTE — PROGRESS NOTES
I reviewed pertinent labs and imaging, and discussed /agreed on the plan of care with Dr. Josie Malloy. Hospitalist Progress Note NAME: Ino Mondragon :  1945 MRN:  [id number] History of Present Illness: 
Deepak Gomez is a 69 y.o.  female the past medical history of COPD, diabetes mellitus, hyperlipidemia, hypertension who presented to the hospital for worsening shortness of breath associated with hypoxia and fever. Patient patient presented to the ED on  with cough and chills and weight loss had a CT of the chest done which showed extensive lung mass with collapse of the lower lobe lung, and was supposed to follow-up with oncology. She presented today because of worsening shortness of breath associated with fever chills and persistent cough . In the ED, patient was febrile tempe 103.2, tachypneic with respiratory rate around 46 requiring BiPAP. His labs reveal elevated white blood cell count at 20 5K with a left shift, hypokalemia and hyponatremia. She had an appointment with oncology scheduled but was feeling too poorly to wait until that appointment and came to ED instead. Assessment / Plan: 
Plan to transfer patient to [facility], Dr. Samantha Hair spoke to Dr. Bharat Jasso an interventional pulmonologist who recommended transfer d/t left mainstem obstruction. However, [facility] is at [facility] and not accepting transfers at this time. Transfer center will alert us if that changes. Acute on chronic hypoxic respiratory failure, POA d/t Locally advanced left lung mass, LLL lung mass, left mainstem occlusion, and small effusion Total collapse of the left lung · BiPAP PRN; currently on O2 3L NC  
· Continue IV Zosyn · Appreciate pulmonary input; recommend transfer to [facility] · CXR:  Opacification of the left hemithorax with evidence of associated volume loss · CT chest:  1.  Presence of abnormal soft tissue density associated with the majority of the left hemithorax with evidence of associated volume loss. This is primarily secondary to atelectasis/collapse of the left lung secondary to the mass lesion which obstructs the left mainstem bronchus. A left pleural effusion is likely. No pneumothorax detected. 2. Evidence of mediastinal adenopathy. 3. Normal appearance of the right lung. · WBC 22.8; improved from 25.7 on admission · Appreciate oncology input · Palliative consulted Urinary tract infection, POA · UA with 30 protein, 15 ketones, moderate blood, small leuks, 10-20 WBC, 4+ bacteria · UC pending · WBC 22.8 · Continue IV Zosyn · BC with no growth after 18 hours Hypokalemia · K 3.2; replace and monitor COPD, not in acute exacerbation Hx of tobacco abuse · Continue with nebs and home inhalers DM 
HTN 
HLD · BS AC&HS 
· SSI · Hold metformin · Continue statin · Continue Cozaar and Toprol Severe protein calorie malnutrition · Continue home Megace · RD consulted 18.5 - 24.9 Normal weight / Body mass index is 22.89 kg/m². Code status: Full Prophylaxis: Hep SQ Recommended Disposition: TBD Subjective: Chief Complaint / Reason for Physician Visit Patient resting in room with daughter at bedside. Updated on plan of care. Discussed with RN events overnight. Review of Systems: 
Symptom Y/N Comments  Symptom Y/N Comments Fever/Chills n   Chest Pain n   
Poor Appetite y   Edema Cough    Abdominal Pain Sputum    Joint Pain SOB/FISCHER y   Pruritis/Rash Nausea/vomit    Tolerating PT/OT Diarrhea    Tolerating Diet y Constipation    Other Could NOT obtain due to:   
 
Objective: VITALS:  
Last 24hrs VS reviewed since prior progress note. Most recent are: 
Patient Vitals for the past 24 hrs: 
 Temp Pulse Resp BP SpO2  
02/27/20 1119 97.5 °F (36.4 °C) 89 18 145/75 93 % 02/27/20 0738     94 % 02/27/20 0712 97.4 °F (36.3 °C) 92 16 (!) 146/92 95 % 02/27/20 0234     97 % 02/27/20 0233 97.4 °F (36.3 °C) 77 22 151/72 97 % 02/26/20 2229 98 °F (36.7 °C) 89 22 145/78 95 % 02/26/20 2136     97 % 02/26/20 2007 97.5 °F (36.4 °C) 89 25 145/78 92 % 02/26/20 1920 97.7 °F (36.5 °C) 85 28 (!) 156/92 95 % 02/26/20 1830  87 23  95 % 02/26/20 1637 96.8 °F (36 °C) 89 28 147/84 97 % 02/26/20 1632     97 % Intake/Output Summary (Last 24 hours) at 2/27/2020 1242 Last data filed at 2/27/2020 1216 Gross per 24 hour Intake 480 ml Output 900 ml Net -420 ml PHYSICAL EXAM: 
General: Pleasant AA female. NAD    
EENT:  EOMI. Anicteric sclerae. MMM Resp:  Lung sounds absent on left. No accessory muscle use CV:  Regular rate rhythm,  No edema GI:  Soft, Non distended, Non tender.  +Bowel sounds Neurologic:  Alert and oriented X 3, normal speech, Psych:   Good insight. Not anxious nor agitated Skin:  No rashes. No jaundice Reviewed most current lab test results and cultures  YES Reviewed most current radiology test results   YES Review and summation of old records today    NO Reviewed patient's current orders and MAR    YES 
PMH/SH reviewed - no change compared to H&P 
________________________________________________________________________ Care Plan discussed with: 
  Comments Patient x Family  x Daughter at bedside RN x Care Manager Consultant  x Dr. Dana Payton, Pulmonary Multidiciplinary team rounds were held today with , nursing, pharmacist and clinical coordinator. Patient's plan of care was discussed; medications were reviewed and discharge planning was addressed. ________________________________________________________________________ Total NON critical care TIME:  36   Minutes Total CRITICAL CARE TIME Spent:   Minutes non procedure based Comments >50% of visit spent in counseling and coordination of care ________________________________________________________________________ Arsen Connors NP Procedures: see electronic medical records for all procedures/Xrays and details which were not copied into this note but were reviewed prior to creation of Plan. LABS: 
I reviewed today's most current labs and imaging studies. Pertinent labs include: 
Recent Labs  
  02/27/20 
0500 02/26/20 
1139 WBC 22.8* 25.7* HGB 9.5* 11.2* HCT 28.6* 33.8*  
* 566* Recent Labs  
  02/27/20 
0500 02/26/20 
1139  134* K 3.2* 3.3*  
* 100 CO2 20* 20* * 161* BUN 12 10 CREA 0.62 0.70 CA 8.5 9.2 ALB  --  2.6* TBILI  --  0.7 SGOT  --  17 ALT  --  17 Signed: Arsen Connors NP

## 2020-02-27 NOTE — PROGRESS NOTES
0700: Bedside shift change report given to FAUSTO Odom (oncoming nurse) by Afua Almazan RN (offgoing nurse). Report included the following information SBAR, Kardex, Intake/Output and MAR.  
 
0700: Patient in bed, resting quietly. Call bell in reach, will monitor. 0915: Dr. Usha Jonas at bedside. Spoke to patient and family about not doing bronchoscopy today. Plan [facility] to [facility] transfer [facility] for [facility] treatment. Dr. Usha Jonas gave verbal orders for me to add a diet for patient. 1105: Patient ambulated to the bathroom. She is a X1 assist and tolerated activity well.  
 
1500: Reviewed patient's notes. Ideally patient will transfer to [facility]. However, [facility] is not accepting transfers right now, but will notify us if that changes. 1737: Spoke with representative from the transfer center at [facility]. [facility], but a bed assignment has not yet been made. I will not set up transport until a bed has been assigned. Patient and MDs made aware. 1752Christal Clancy NP notified of the transfer. She will fill out her portion of the EMTALA. I will wait to hear back regarding bed placement. 1834: EMTALA filled out, except for ETA of AMS d/t transport not being set up yet. Discharge order in and discharge papers printed and placed in patient's bin. 1900: Bedside shift change report given to Afua Almazan RN (oncoming nurse) by FAUSTO Odom (offgoing nurse). Report included the following information SBAR, Kardex, Intake/Output and MAR.

## 2020-02-27 NOTE — PROGRESS NOTES
Spiritual Care Assessment/Progress Note [address] 
 
 
NAME: Edinson Forbes      MRN: [id number] AGE: 69 y.o. SEX: female Episcopal Affiliation: Caodaism  
Language: English  
 
2/27/2020     Total Time (in minutes): 20 Spiritual Assessment begun in [facility] 2 PROGRESSIVE CARE through conversation with: 
  
    [x]Patient        [] Family    [] Friend(s) Reason for Consult: Palliative Care, Follow-up Spiritual beliefs: (Please include comment if needed) [x] Identifies with a demar tradition:   Caodaism  
   [x] Supported by a demar community:        
   [] Claims no spiritual orientation:       
   [] Seeking spiritual identity:            
   [] Adheres to an individual form of spirituality:       
   [] Not able to assess:                   
 
    
Identified resources for coping:  
   [x] Prayer                           
   [] Music                  [] Guided Imagery [x] Family/friends                 [] Pet visits [] Devotional reading                         [] Unknown 
   [] Other:                                         
 
 
Interventions offered during this visit: (See comments for more details) Patient Interventions: Affirmation of demar, Affirmation of emotions/emotional suffering, Catharsis/review of pertinent events in supportive environment, Iconic (affirming the presence of God/Higher Power), Initial/Spiritual assessment, patient floor, Normalization of emotional/spiritual concerns, Prayer (assurance of), Episcopal beliefs/image of God discussed Plan of Care: 
 
 [x] Support spiritual and/or cultural needs  
 [] Support AMD and/or advance care planning process    
 [] Support grieving process 
 [] Coordinate Rites and/or Rituals  
 [] Coordination with community clergy [] No spiritual needs identified at this time 
 [] Detailed Plan of Care below (See Comments)  [] Make referral to Music Therapy 
[] Make referral to Pet Therapy [] Make referral to Addiction services 
[] Make referral to [facility] 
[] Make referral to Spiritual Care Partner 
[] No future visits requested       
[x] Follow up visits as needed Comments:  Initial visit on PCU for spiritual assessment of new palliative consult patient. Patient's son was present. Patient wanted her water refilled which was done. Consulted with her nurse regarding patient's request for regular ginger ale; patient unable to have it-nurse will speak to her. Patient shared she is feeling better today. She identifies as Scientology and belongs to a Christian in [facility]. Ms. Karon aGndhi expressed no spiritual needs or concerns at this time. Provided listening presence and assurance of prayer. Advised patient of  availability. ADY Bruce, [facility], Staff  [facility]  Paging Service  [phone])

## 2020-02-27 NOTE — PROGRESS NOTES
TRANSFER - IN REPORT: 
 
Verbal report received from ED RN(name) on Bill Irving  being received from ED(unit) for routine progression of care Report consisted of patients Situation, Background, Assessment and  
Recommendations(SBAR). Information from the following report(s) SBAR, Kardex, Intake/Output, MAR, Recent Results and Cardiac Rhythm sinus was reviewed with the receiving nurse. Opportunity for questions and clarification was provided. Assessment completed upon patients arrival to unit and care assumed. 9275: Pt had uneventful night. VSS. No c/o of pain.  Report given to Nora Paige RN

## 2020-02-27 NOTE — PROGRESS NOTES
ADULT PROTOCOL: JET AEROSOL ASSESSMENT Patient  London Baum     69 y.o.   female     2/27/2020  10:30 AM 
 
Breath Sounds Pre Procedure: Right Breath Sounds: Diminished Left Breath Sounds: Diminished Breath Sounds Post Procedure: Right Breath Sounds: Diminished Left Breath Sounds: Diminished Breathing pattern: Pre procedure Breathing Pattern: Regular Post procedure Breathing Pattern: Regular Heart Rate: Pre procedure Pulse: 90 
         Post procedure Pulse: 104 Resp Rate: Pre procedure Respirations: 18 Post procedure Respirations: 18 
 
 
Oxygen: O2 Device: Nasal cannula   3L SpO2: Pre procedure SpO2: 94 % Post procedure SpO2: 93 % Nebulizer Therapy: Current medications Aerosolized Medications: Cheryln Panning Smoking History: Current Problem List:  
Patient Active Problem List  
Diagnosis Code  Diabetes ([facility] [address] E11.9  Hypertension I10  
 Hyperlipidemia E78.5  Arthritis, degenerative M19.90  Smoker F17.200  Type 2 diabetes with nephropathy (HCC) E11.21  
 SOB (shortness of breath) R06.02  
 Acute respiratory failure with hypoxia (HCC) J96.01 Respiratory Therapist: Adriana Dawson V RT

## 2020-02-27 NOTE — H&P
Hospitalist Admission Note NAME: Shawn Arellano :  1945 MRN:  [id number] Date/Time:  2020 7:21 PM 
 
Patient PCP: Ash Dasilva NP 
______________________________________________________________________ Given the patient's current clinical presentation, I have a high level of concern for decompensation if discharged from the emergency department. Complex decision making was performed, which includes reviewing the patient's available past medical records, laboratory results, and x-ray films. My assessment of this patient's clinical condition and my plan of care is as follows. Assessment / Plan: 
Sepsis secondary to most likely postobstructive pneumonia Acute respiratory failure with hypoxia Locally advanced left lung mass, concerning for lung cancer Total collapse of the left lung Will admit to stepdown unit, continue with as needed BiPAP and oxygen with nasal cannula. Patient was given ceftriaxone and Zithromax in the ED. We will continue with IV Zosyn to cover for any anaerobes Patient was seen by pulmonologist, appreciate input . Discussed with Dr. Earnestine Dakin attempted bronchoscopy tomorrow for tissue diagnosis and possible transfer to [facility] after further discussion of the case with his colleagues CT chest : 
1. Presence of abnormal soft tissue density associated with the majority of the 
left hemithorax with evidence of associated volume loss. This is primarily 
secondary to atelectasis/collapse of the left lung secondary to the mass lesion 
which obstructs the left mainstem bronchus. A left pleural effusion is likely. No pneumothorax detected. 2. Evidence of mediastinal adenopathy. 3. Normal appearance of the right lung. N.p.o. after midnight for possible bronch Appreciate courtesy note from   Dr. Seb Pedro Hypokalemia Will replete COPD not in acute exacerbation History of tobacco abuse Continue with substitute of home inhalers,  Nebs Diabetes mellitus Hyperlipidemia Hypertension Continue with sliding scale insulin, hold metformin Continue with statin Continue with Cozaar, Toprol Severe protein caloric malnutrition Continue with home dose Megace Patient is at high risk of deterioration due to underlying advanced lung mass Discussed with family who wants patient to remain full code We will consult palliative care Code Status: Full code Surrogate Decision Maker: Her daughter Marc Ferreira [id number] DVT Prophylaxis: Heparin GI Prophylaxis: not indicated Baseline: ambulatory Subjective: CHIEF COMPLAINT: sob + hypoxia HISTORY OF PRESENT ILLNESS:    
Maikel Vickers is a 69 y.o.  female the past medical history of COPD, diabetes mellitus, hyperlipidemia, hypertension who [address] for worsening shortness of breath associated with hypoxia and fever. Patient patient presented to the ED on February 22 with cough and chills and weight loss had a CT of the chest done which showed extensive lung mass with collapse of the lower lobe lung, and was supposed to follow-up with oncology. She presented today because of worsening shortness of breath associated with fever chills and persistent cough . In the ED, patient was febrile tempe 103.2, tachypneic with respiratory rate around 46 requiring BiPAP. His labs reveal elevated white blood cell count at 20 5K with a left shift, hypokalemia and hyponatremia. Repeat CT scan of the chest showed: Total collapse of the left lung 1. Presence of abnormal soft tissue density associated with the majority of the 
left hemithorax with evidence of associated volume loss. This is primarily 
secondary to atelectasis/collapse of the left lung secondary to the mass lesion 
which obstructs the left mainstem bronchus. A left pleural effusion is likely. No pneumothorax detected. 2. Evidence of mediastinal adenopathy. 3. Normal appearance of the right lung. We were asked to admit for work up and evaluation of the above problems. Past Medical History:  
Diagnosis Date  Chronic obstructive pulmonary disease ([facility] [address]  Diabetes ([facility] [address]  Environmental allergies   5   
 para 5  
 Hyperlipidemia  Hypertension  Hypokalemia  Menopause Past Surgical History:  
Procedure Laterality Date  HX CATARACT REMOVAL  2013. Social History Tobacco Use  Smoking status: Current Every Day Smoker  Smokeless tobacco: Never Used Substance Use Topics  Alcohol use: Yes Family History Problem Relation Age of Onset  Cancer Mother COLON CA ?  Cancer Father ? UNKNOWN TYPE  
 Diabetes Son No Known Allergies Prior to Admission medications Medication Sig Start Date End Date Taking? Authorizing Provider  
albuterol (PROVENTIL VENTOLIN) 2.5 mg /3 mL (0.083 %) nebu 3 mL by Nebulization route every four (4) hours as needed for Wheezing. 20   Charm Councilman, NP Nebulizer & Compressor machine Use every 4 hours as needed for wheezing 20   Charm Councilman, NP  
umeclidinium-vilanterol (ANORO ELLIPTA) 62.5-25 mcg/actuation inhaler Take 1 Puff by inhalation daily. 20   Charm Councilman, NP  
megestrol (MEGACE) 40 mg tablet Take 1 Tab by mouth two (2) times a day. Indications: cachexia 20   Charm Councilman, NP  
budesonide-formoterol [facility]) 160-4.5 mcg/actuation HFAA Take 2 Puffs by inhalation two (2) times a day. Indications: Bronchospasm Prevention with COPD 19   Charm Councilman, NP  
albuterol (VENTOLIN HFA) 90 mcg/actuation inhaler INHALE TWO PUFFS BY MOUTH EVERY 6 HOURS AS NEEDED FOR  WHEEZING  FOR  ACUTE  ASTHMA  ATTACK 19   Charm Councilman, NP  
metoprolol succinate (TOPROL-XL) 50 mg XL tablet TAKE 1 TABLET BY MOUTH ONCE DAILY 19   Ayleen Reardon, DO  
ergocalciferol, vitamin D2, (VITAMIN D2 PO) Take  by mouth.     Provider, Historical  
 metFORMIN ER (GLUCOPHAGE XR) 500 mg tablet TAKE ONE TABLET BY MOUTH ONCE DAILY WITH  DINNER  FOR  TYPE  2  DIABETES  MELLITUS 3/1/19   Xiang Nieves NP  
losartan (COZAAR) 100 mg tablet TAKE 1 TABLET BY MOUTH ONCE DAILY FOR  HYPERTENSION 3/1/19   Xiang Nieves NP  
busPIRone (BUSPAR) 5 mg tablet Take 1 Tab by mouth two (2) times a day. 2/7/19   Xiang Nieves NP  
simvastatin (ZOCOR) 20 mg tablet TAKE 1 TABLET BY MOUTH NIGHTLY 4/7/17   ARMANDO Watt  
chlorthalidone (HYGROTEN) 25 mg tablet Take 1 Tab by mouth daily. Indications: HYPERTENSION 4/7/16   RAMANDO Watt  
 
 
REVIEW OF SYSTEMS:    
I am not able to complete the review of systems because: The patient is intubated and sedated The patient has altered mental status due to his acute medical problems The patient has baseline aphasia from prior stroke(s) The patient has baseline dementia and is not reliable historian The patient is in acute medical distress and unable to provide information Total of 12 systems reviewed as follows:   
   POSITIVE= underlined text  Negative = text not underlined General:  fever, chills, sweats, generalized weakness, weight loss/gain,  
   loss of appetite Eyes:    blurred vision, eye pain, loss of vision, double vision ENT:    rhinorrhea, pharyngitis Respiratory:   cough, sputum production, SOB, FISCHER, wheezing, pleuritic pain  
Cardiology:   chest pain, palpitations, orthopnea, PND, edema, syncope Gastrointestinal:  abdominal pain , N/V, diarrhea, dysphagia, constipation, bleeding Genitourinary:  frequency, urgency, dysuria, hematuria, incontinence Muskuloskeletal :  arthralgia, myalgia, back pain Hematology:  easy bruising, nose or gum bleeding, lymphadenopathy Dermatological: rash, ulceration, pruritis, color change / jaundice Endocrine:   hot flashes or polydipsia Neurological:  headache, dizziness, confusion, focal weakness, paresthesia, 
 Speech difficulties, memory loss, gait difficulty Psychological: Feelings of anxiety, depression, agitation Objective: VITALS:   
Visit Vitals /84 Pulse 87 Temp 96.8 °F (36 °C) Resp 23 Ht 5' 9\" (1.753 m) Wt 70.3 kg (154 lb 15.7 oz) SpO2 95% BMI 22.89 kg/m² PHYSICAL EXAM: 
 
 
_______________________________________________________________________ Care Plan discussed with: 
  Comments Patient Family RN Care Manager Consultant:     
_______________________________________________________________________ Expected  Disposition:  
Home with Family HH/PT/OT/RN   
SNF/LTC   
ANNE MARIE   
________________________________________________________________________ TOTAL TIME:  65 Minutes Critical Care Provided     Minutes non procedure based Comments  
 x Reviewed previous records  
>50% of visit spent in counseling and coordination of care x Discussion with patient and/or family and questions answered 
  
 
________________________________________________________________________ Signed: Douglas Umana MD 
 
Procedures: see electronic medical records for all procedures/Xrays and details which were not copied into this note but were reviewed prior to creation of Plan. LAB DATA REVIEWED:   
Recent Results (from the past 24 hour(s)) INFLUENZA A+B VIRAL AGS Collection Time: 02/26/20 11:15 AM  
Result Value Ref Range Influenza A Antigen NEGATIVE  NEG Influenza B Antigen NEGATIVE  NEG    
EKG, 12 LEAD, INITIAL Collection Time: 02/26/20 11:34 AM  
Result Value Ref Range Ventricular Rate 130 BPM  
 Atrial Rate 130 BPM  
 P-R Interval 118 ms QRS Duration 80 ms  
 Q-T Interval 314 ms QTC Calculation (Bezet) 462 ms Calculated P Axis 82 degrees Calculated R Axis 67 degrees Calculated T Axis 32 degrees Diagnosis Sinus tachycardia with premature supraventricular complexes ST & T wave abnormality, consider anterior ischemia Abnormal ECG When compared with ECG of 22-FEB-2020 14:32, 
premature supraventricular complexes are now present T wave inversion no longer evident in Inferior leads T wave inversion now evident in Anterior leads Nonspecific T wave abnormality has replaced inverted T waves in Lateral leads LACTIC ACID Collection Time: 02/26/20 11:39 AM  
Result Value Ref Range Lactic acid 1.3 0.4 - 2.0 MMOL/L  
METABOLIC PANEL, COMPREHENSIVE Collection Time: 02/26/20 11:39 AM  
Result Value Ref Range Sodium 134 (L) 136 - 145 mmol/L Potassium 3.3 (L) 3.5 - 5.1 mmol/L Chloride 100 97 - 108 mmol/L  
 CO2 20 (L) 21 - 32 mmol/L Anion gap 14 5 - 15 mmol/L Glucose 161 (H) 65 - 100 mg/dL BUN 10 6 - 20 MG/DL Creatinine 0.70 0.55 - 1.02 MG/DL  
 BUN/Creatinine ratio 14 12 - 20 GFR est AA >60 >60 ml/min/1.73m2 GFR est non-AA >60 >60 ml/min/1.73m2 Calcium 9.2 8.5 - 10.1 MG/DL Bilirubin, total 0.7 0.2 - 1.0 MG/DL  
 ALT (SGPT) 17 12 - 78 U/L  
 AST (SGOT) 17 15 - 37 U/L Alk.  phosphatase 92 45 - 117 U/L  
 Protein, total 8.9 (H) 6.4 - 8.2 g/dL Albumin 2.6 (L) 3.5 - 5.0 g/dL Globulin 6.3 (H) 2.0 - 4.0 g/dL A-G Ratio 0.4 (L) 1.1 - 2.2    
CBC WITH AUTOMATED DIFF Collection Time: 02/26/20 11:39 AM  
Result Value Ref Range WBC 25.7 (H) 3.6 - 11.0 K/uL  
 RBC 4.07 3.80 - 5.20 M/uL  
 HGB 11.2 (L) 11.5 - 16.0 g/dL HCT 33.8 (L) 35.0 - 47.0 % MCV 83.0 80.0 - 99.0 FL  
 MCH 27.5 26.0 - 34.0 PG  
 MCHC 33.1 30.0 - 36.5 g/dL  
 RDW 14.1 11.5 - 14.5 % PLATELET 634 (H) 159 - 400 K/uL MPV 8.7 (L) 8.9 - 12.9 FL  
 NRBC 0.0 0  WBC ABSOLUTE NRBC 0.00 0.00 - 0.01 K/uL NEUTROPHILS 83 (H) 32 - 75 % LYMPHOCYTES 11 (L) 12 - 49 % MONOCYTES 5 5 - 13 % EOSINOPHILS 0 0 - 7 % BASOPHILS 0 0 - 1 % IMMATURE GRANULOCYTES 1 (H) 0.0 - 0.5 % ABS. NEUTROPHILS 21.3 (H) 1.8 - 8.0 K/UL  
 ABS. LYMPHOCYTES 2.8 0.8 - 3.5 K/UL  
 ABS. MONOCYTES 1.3 (H) 0.0 - 1.0 K/UL  
 ABS. EOSINOPHILS 0.0 0.0 - 0.4 K/UL  
 ABS. BASOPHILS 0.0 0.0 - 0.1 K/UL  
 ABS. IMM. GRANS. 0.3 (H) 0.00 - 0.04 K/UL  
 DF SMEAR SCANNED    
 PLATELET COMMENTS RESULTS VERIFIED BY SMEAR    
 RBC COMMENTS NORMOCYTIC, NORMOCHROMIC    
URINALYSIS W/ REFLEX CULTURE Collection Time: 02/26/20 11:40 AM  
Result Value Ref Range Color YELLOW/STRAW (A) YEL Appearance HAZY (A) CLEAR Specific gravity 1.015    
 pH (UA) 6.5 5.0 - 8.0 Protein 30 (A) NEG mg/dL Glucose NEGATIVE  NEG mg/dL Ketone 15 (A) NEG mg/dL Blood MODERATE (A) NEG Urobilinogen 1.0 0.2 - 1.0 EU/dL Nitrites NEGATIVE  NEG Leukocyte Esterase SMALL (A) NEG    
 WBC 10-20 /hpf  
 RBC 20-50 /hpf Epithelial cells FEW /lpf Bacteria 4+ /hpf  
 UA:UC IF INDICATED URINE CULTURE ORDERED URINE CULTURE HOLD SAMPLE Collection Time: 02/26/20 11:40 AM  
Result Value Ref Range Urine culture hold Urine on hold in Microbiology dept for 2 days.   If unpreserved urine is submitted, it cannot be used for addtional testing after 24 hours, recollection will be required. BILIRUBIN, CONFIRM Collection Time: 02/26/20 11:40 AM  
Result Value Ref Range  Bilirubin UA, confirm NEGATIVE  NEG

## 2020-02-27 NOTE — PROGRESS NOTES
PALLIATIVE MEDICINE Palliative Medicine consult received, will see pt tomorrow. Thank you for including Palliative Medicine in this patient's care.   
JOSE Munson

## 2020-02-28 NOTE — PHYSICIAN ADVISORY
Letter of Status Determination:  
Recommend hospitalization status upgraded from INPATIENT  to INPATIENT Status Pt Name:  Kendra Lopez MR#  
MONICA # [id number] / 
[id number] Payor: [employer] / Plan: [address] / Product Type: Medicare /   
JUSTIN#  [id number] Room and Hospital  2274/01  @ [facility] Hospitalization date  2/26/2020  4:28 PM  
Current Attending Physician  No att. providers found Principal diagnosis  SOB (shortness of breath) [R06.02] Acute respiratory failure with hypoxia ([address] [J96.01] Clinicals  69 y.o. y.o  female hospitalized with above diagnosis 69 y.o.   female the past medical history of COPD, diabetes mellitus, hyperlipidemia, [address] for worsening shortness of breath associated with hypoxia and fever. Patient patient presented to the ED on February 22 with cough and chills and weight loss had a CT of the chest done which showed extensive lung mass with collapse of the lower lobe lung, and was supposed to follow-up with oncology.  She presented today because of worsening shortness of breath associated with fever chills and persistent cough . 
In the ED, patient was febrile tempe 103.2, tachypneic with respiratory rate around 46 requiring BiPAP. His labs reveal elevated white blood cell count at 20 5K with a left shift, hypokalemia and hyponatremia. [place] (MCG) criteria Does  NOT apply STATUS DETERMINATION  Keep inpatient Additional comments Payor: [employer] / Plan: [address] / Product Type: Medicare /   
  
 
 
Mirza Delaney [address] [place] [address] T: [phone]    [email or web address]

## 2020-02-28 NOTE — PROGRESS NOTES
Bedside and Verbal shift change report given to [facility] (oncoming nurse) by [address] [facility] (offgoing nurse). Report included the following information SBAR, Kardex, Intake/Output, MAR, Recent Results and Cardiac Rhythm NSR.  
2022: Called radiology to get pt's imaging on disk. 2025: Report called to [facility]. Report given to [facility]. Unit: [facility].  
2125: Called transport. 2340: AMS in room to transport patient. EMTALA completed. Pt VSS. No c/o of pain. Daughter left with patient.

## 2020-07-08 NOTE — PROGRESS NOTES
Patient contacted regarding recent discharge and COVID-19 risk. Discussed COVID-19 related testing which was not done at this time. Test results were not done. Patient informed of results, if available? n/a Care Transition Nurse/ Ambulatory Care Manager contacted the family  (daughter) by telephone to perform post discharge assessment. Verified name and  with family as identifiers. Patient has following risk factors of: diabetes, COPD, lung mass . CTN/ACM reviewed discharge instructions, medical action plan and red flags related to discharge diagnosis. Reviewed and educated them on any new and changed medications related to discharge diagnosis. Advised obtaining a 90-day supply of all daily and as-needed medications. Education provided regarding infection prevention, and signs and symptoms of COVID-19 and when to seek medical attention with family who verbalized understanding. Discussed exposure protocols and quarantine from [address] you at higher risk for severe illness  and given an opportunity for questions and concerns. The family agrees to contact the COVID-19 hotline [phone] or PCP office for questions related to their healthcare. CTN/ACM provided contact information for future reference. From CDC: Are you at higher risk for severe illness?  Wash your hands often.  Avoid close contact (6 feet, which is about two arm lengths) with people who are sick.  Put distance between yourself and other people if COVID-19 is spreading in your community.  Clean and disinfect frequently touched surfaces.  Avoid all cruise travel and non-essential air travel.  Call your healthcare professional if you have concerns about COVID-19 and your underlying condition or if you are sick. For more information on steps you can take to protect yourself, see CDC's How to Protect Yourself Patient/family/caregiver given information for [employer] and agrees to enroll yes Patient's preferred e-mail:  [email or web address] Patient's preferred phone [phone] Based on Loop alert triggers, patient will be contacted by nurse care manager for worsening symptoms. Pt will be further monitored by COVID Loop Team based on severity of symptoms and risk factors. Daughter will call for appointment today.

## 2020-08-10 NOTE — PROGRESS NOTES
Patient contacted regarding COVID-19 risk. Discussed COVID-19 related testing which was was not at this time. Care Transition Nurse/ Ambulatory Care Manager/ LPN Care Coordinator contacted the pt by telephone to perform post discharge assessment. Verified name and  with pt as identifiers. Provided introduction to self, and explanation of the CTN/ACM/LPN role, and reason for call due to risk factors for infection and/or exposure to COVID-19. Symptoms reviewed with pt who verbalized the following symptoms: none. Due to no symptoms encounter was noot routed to provider for escalation. Discussed follow-up appointments. If no appointment was previously scheduled, appointment scheduling offered: [facility] follow up appointment(s): No future appointments. Non-[facility] follow up appointment(s):none Advance Care Planning:  
Does patient have an Advance Directive: no- declined education Patient has following risk factors of: COPD. CTN/ACM/LPN reviewed discharge instructions, medical action plan and red flags such as increased shortness of breath, increasing fever and signs of decompensation with pt who verbalized understanding. Discussed exposure protocols and quarantine with CDC Guidelines What to do if you are sick with coronavirus disease 2019.  pt was given an opportunity for questions and concerns. The pt agrees to contact the [facility] exposure line [phone], local health department  and PCP office for questions related to their healthcare. CTN/ACM provided contact information for future needs. Reviewed and educated pt on any new and changed medications related to discharge diagnosis. Patient/family/caregiver given information for [employer] and agrees to enroll no 
 
 
14 day call based on severity of symptoms and risk factors.

## 2020-08-12 PROBLEM — R50.9 FEVER: Status: ACTIVE | Noted: 2020-01-01

## 2020-08-12 PROBLEM — C34.92 MALIGNANT NEOPLASM OF LEFT LUNG (HCC): Chronic | Status: ACTIVE | Noted: 2020-01-01

## 2020-08-12 PROBLEM — J18.9 PNEUMONIA: Status: ACTIVE | Noted: 2020-01-01

## 2020-08-12 PROBLEM — A41.9 SEPSIS DUE TO PNEUMONIA (HCC): Status: ACTIVE | Noted: 2020-01-01

## 2020-08-12 PROBLEM — J18.9 SEPSIS DUE TO PNEUMONIA (HCC): Status: ACTIVE | Noted: 2020-01-01

## 2020-08-15 PROBLEM — R19.00 PELVIC MASS: Status: ACTIVE | Noted: 2020-01-01

## 2020-08-16 PROBLEM — D63.8 ANEMIA, CHRONIC DISEASE: Status: ACTIVE | Noted: 2020-01-01

## 2020-08-18 NOTE — PROGRESS NOTES
20 Care transitions nurse Ellen Irizarry RN, CHFN, [facility] Care transitions nurse [phone] [facility] Coordination Team 
 
Patient was admitted to [facility] on  and discharged on 2020 for lethargy, temp 105. Patient was contacted within 1 business days of discharge. Pt assigned for Covid follow up - done as CARLA d/t Pneumonia, sepsis - ttk Top Discharge Challenges to be reviewed by the provider Additional needs identified to be addressed with provider yes Pt's daughter is her paid care giver - through [place] [address] - Medicaid - (she cares for her daughter, as well - Her daughter, Hodan Sanchez, is a CNA. Cough therapy was not affordable at [facility] - daughter is taking Medicaid card to [employer] - or will ask pharmacist for alternative. Discussed COVID-19 related testing which was available at this time. Test results were negative. Patient informed of results, if available? yes Method of communication with provider : none Advance Care Planning:  
Does patient have an Advance Directive:  not on file; education provided Inpatient Readmission Risk score: 30% Was this a readmission? no  
Patient stated reason for the admission: fever, lethargy; pneumonia Patients top risk factors for readmission: functional physical ability, lack of knowledge about disease, medical condition and stages of grief Interventions to address risk factors: Review of discharge rx with daughter and med rec - Review of hospital course with pt's daughter - who is her care taker. Care Transition Nurse (CTN) contacted the patient by telephone to perform post hospital discharge assessment. Verified name and  with patient as identifiers. Provided introduction to self, and explanation of the CTN role. CTN reviewed discharge instructions, medical action plan and red flags with family who verbalized understanding.  Family given an opportunity to ask questions and does not have any further questions or concerns at this time. The family agrees to contact the PCP office for questions related to their healthcare. Medication reconciliation was performed with family, who verbalizes understanding of administration of home medications. Advised obtaining a 90-day supply of all daily and as-needed medications. Referral to Pharm D needed: no  
 
Home Health/Outpatient orders at discharge: none [address] [facility]: n/a Date of initial visit: n/a Durable Medical Equipment ordered at discharge: none [address]: n/a Durable Medical Equipment received: n/a Covid Risk Education Patient has following risk factors of: COPD, pneumonia, sepsis and diabetes. Education provided regarding infection prevention, and signs and symptoms of COVID-19 and when to seek medical attention with family who verbalized understanding. Discussed exposure protocols and quarantine From CDC: Are you at higher risk for severe illness?  and given an opportunity for questions and concerns. The family agrees to contact the COVID-19 hotline [phone] or PCP office for questions related to COVID-19. For more information on steps you can take to protect yourself, see CDC's How to Protect Yourself Patient/family/caregiver given information for [employer] and agrees to enroll yes Patient's preferred e-mail: [email or web address] Patient's preferred phone number: [phone] Discussed follow-up appointments. If no appointment was previously scheduled, appointment scheduling offered: yes [facility] follow up appointment(s):  
Future Appointments Date Time Provider Loli Santoyo 8/24/2020 11:30 AM Ronal Matute NP [address] [facility] follow up appointment(s): Dr. Itz Villarreal 9/3 at 9:20                                                                   Ms. Oskar Nielson - GIOVANNI - 8/24/20 -  
 
 Plan for follow-up call in 7-10 days based on severity of symptoms and risk factors. CTN provided contact information for future needs. Goals  Attends follow-up appointments as directed. 8/18/20 Pt to see pcp on 8/24 - hospital follow up. 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 ttk  Knowledge and adherence of prescribed medication (ie. action, side effects, missed dose, etc.).   
  8/18/20 Review of medications with pt's daughter - CNA - who is her paid caregiver. She was doing medication box - Review of new therapies including Levaquin, Metoprolol, pro-biotic and cough therapies -  
Daughter to take Medicaid card to [place] / as Tuccinex was too expensive and they only had Medicare card. Daughter has stool softener - no bm for 3 days - she will administer daily - 
Daughter will give 1 dose Miralax. Airway protocol reviewed with daughter - pt has nebulizer, is on Anoro - maintenance. Cough therapy to suppress irritation discussed. ttk 
  
  
 
_____________________________________________________________________________ 
8/13/2020 10:24 AM - Christophe, Lab In [facility]  
 
Component  Value  Flag  Ref Range  Units  Status Specimen source  Nasopharyngeal       Final   
SARS-CoV-2  Not detected   NOTD     Final

## 2021-11-30 NOTE — TELEPHONE ENCOUNTER
Highly recommend she tries the following for anxiety first.  Coping Strategies  Try these when you're feeling anxious or stressed: Take a time-out. Practice yoga, listen to music, meditate, get a massage, or learn relaxation techniques. Stepping back from the problem helps clear your head. Eat well-balanced meals. Do not skip any meals. Do keep healthful, energy-boosting snacks on hand. Limit alcohol and caffeine, which can aggravate anxiety and trigger panic attacks. Get enough sleep. When stressed, your body needs additional sleep and rest.   Exercise daily to help you feel good and maintain your health. Check out the fitness tips below. Take deep breaths. Inhale and exhale slowly. Count to 10 slowly. Repeat, and count to 20 if necessary. Do your best. Instead of aiming for perfection, which isn't possible, be proud of however close you get. Accept that you cannot control everything. Put your stress in perspective: Is it really as bad as you think? Welcome humor. A good laugh goes a long way. Maintain a positive attitude. Make an effort to replace negative thoughts with positive ones. Get involved. Volunteer or find another way to be active in your community, which creates a support network and gives you a break from everyday stress. Learn what triggers your anxiety. Is it work, family, school, or something else you can identify? Write in a journal when youre feeling stressed or anxious, and look for a pattern. Talk to someone. Tell friends and family youre feeling overwhelmed, and let them know how they can help you. Talk to a provideror therapist for professional help. Including counseling. We discussed trying tylenol and the use of ice and heat for general discomfort . Please clarify arm pain history and what she has done so far to make it better. Thanks!   HAIR Oxybutynin Counseling:  I discussed with the patient the risks of oxybutynin including but not limited to skin rash, drowsiness, dry mouth, difficulty urinating, and blurred vision.

## 2023-12-21 NOTE — TELEPHONE ENCOUNTER
Pt requesting \"bulk refill\" to be sent through [facility] for her Trazadone.   Skye Colindres, she was questioning if Jn Song wants the instructions to be done as it is on the Chantix starter pack or does she want to do the 0.5 mg the whole time. Shivam Francis, she stated that she wants to do the starter pack instructions and then the refill can continue as the 1 mg. Harlan stated that Jn Song needs to send a new Rx for the maintenance pack for the 1 mg. Message routed to Jn Song for correction on the refill.

## 2024-05-08 NOTE — TELEPHONE ENCOUNTER
Spoke with patient. She stated that she mentioned to you yesterday that she needed medication refilled. She is declined having on counseling. She also mentioned need for medication for arm pain ? [id number]: previous_has_had_a_previous_microneedling_treatment